# Patient Record
Sex: MALE | Race: WHITE | NOT HISPANIC OR LATINO | Employment: OTHER | URBAN - NONMETROPOLITAN AREA
[De-identification: names, ages, dates, MRNs, and addresses within clinical notes are randomized per-mention and may not be internally consistent; named-entity substitution may affect disease eponyms.]

---

## 2017-03-03 DIAGNOSIS — R06.02 SOB (SHORTNESS OF BREATH): Primary | ICD-10-CM

## 2017-03-07 ENCOUNTER — HOSPITAL ENCOUNTER (OUTPATIENT)
Dept: GENERAL RADIOLOGY | Facility: HOSPITAL | Age: 67
Discharge: HOME | End: 2017-03-07
Attending: INTERNAL MEDICINE | Admitting: INTERNAL MEDICINE

## 2017-03-07 ENCOUNTER — APPOINTMENT (OUTPATIENT)
Dept: GENERAL RADIOLOGY | Facility: HOSPITAL | Age: 67
End: 2017-03-07
Attending: INTERNAL MEDICINE

## 2017-03-07 ENCOUNTER — OFFICE VISIT (OUTPATIENT)
Dept: PULMONOLOGY | Facility: CLINIC | Age: 67
End: 2017-03-07

## 2017-03-07 VITALS
BODY MASS INDEX: 29.8 KG/M2 | HEIGHT: 72 IN | OXYGEN SATURATION: 95 % | TEMPERATURE: 98 F | WEIGHT: 220 LBS | SYSTOLIC BLOOD PRESSURE: 132 MMHG | HEART RATE: 80 BPM | DIASTOLIC BLOOD PRESSURE: 88 MMHG

## 2017-03-07 DIAGNOSIS — R06.02 SOB (SHORTNESS OF BREATH): ICD-10-CM

## 2017-03-07 DIAGNOSIS — J60 CWP (COALWORKERS PNEUMOCONIOSIS) (HCC): Primary | ICD-10-CM

## 2017-03-07 PROCEDURE — 99213 OFFICE O/P EST LOW 20 MIN: CPT | Performed by: INTERNAL MEDICINE

## 2017-03-07 PROCEDURE — 71020 XR CHEST PA AND LATERAL: CPT | Performed by: RADIOLOGY

## 2017-03-07 PROCEDURE — 71020 HC CHEST PA AND LATERAL: CPT

## 2017-03-07 NOTE — PROGRESS NOTES
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  Also has diabetes and hypertension being followed by his family physician.  For his breathing he takes his Spiriva once a day And occasional Use of albuterol inhaler      Review of Systems mild shortness of breath on exertion and occasional use of rescue inhaler diabetes and hypertension is under control    Active Problems:  Problem List Items Addressed This Visit        Respiratory    CWP (coalworkers pneumoconiosis) - Primary          Past Medical History:  Past Medical History   Diagnosis Date   • Arthritis    • Diabetes mellitus        Family History:  History reviewed. No pertinent family history.    Social History:  Social History   Substance Use Topics   • Smoking status: Former Smoker     Years: 35.00     Types: Cigarettes   • Smokeless tobacco: Never Used   • Alcohol use No       Current Medications:  Current Outpatient Prescriptions   Medication Sig Dispense Refill   • ACCU-CHEK SMARTVIEW test strip TEST ONCE DAILY  0   • amitriptyline (ELAVIL) 50 MG tablet   0   • clopidogrel (PLAVIX) 75 MG tablet   0   • fluconazole (DIFLUCAN) 200 MG tablet      • LORazepam (ATIVAN) 1 MG tablet TAKE 1 TABLET BY MOUTH AT BEDTIME. MUST LAST 30 DAYS  0   • losartan (COZAAR) 100 MG tablet take 1 tablet by mouth once daily  0   • losartan-hydrochlorothiazide (HYZAAR) 100-25 MG per tablet      • orphenadrine (NORFLEX) 100 MG 12 hr tablet   0   • simvastatin (ZOCOR) 80 MG tablet   0   • tiotropium (SPIRIVA) 18 MCG per inhalation capsule Place 2 puffs into inhaler and inhale 1 (one) time daily. 30 capsule 11   • VENTOLIN  (90 BASE) MCG/ACT inhaler   0     No current facility-administered medications for this visit.        Allergies:  No Known Allergies    Vitals:  Visit Vitals   • /88 (BP Location: Left arm, Patient Position: Sitting, Cuff  "Size: Adult)   • Pulse 80   • Temp 98 °F (36.7 °C) (Oral)   • Ht 72\" (182.9 cm)   • Wt 220 lb (99.8 kg)   • SpO2 95%   • BMI 29.84 kg/m2       Physical Exam no acute distress vital signs is stable lungs clear heart regular    Imaging: Chest x-ray shows upper lobe masses and change Compared with x-ray of a year ago and slightly worse than November 2014    PFT:  Results for orders placed or performed in visit on 09/07/16   Pulmonary function test   Result Value Ref Range    FEV1 2.22 liters    FVC 3.77 liters           ASSESSMENT AND DIAGNOSIS:1-: Workers pneumoconiosis with progressive massive fibrosis in the stable no change in a year on x-ray2-COPD mild to relatively stable    Recommendation continue current meds    Follow-Up: Return in May With a PFT and further follow-up accordingly                    "

## 2017-05-10 ENCOUNTER — OFFICE VISIT (OUTPATIENT)
Dept: PULMONOLOGY | Facility: CLINIC | Age: 67
End: 2017-05-10

## 2017-05-10 VITALS
TEMPERATURE: 97.8 F | SYSTOLIC BLOOD PRESSURE: 160 MMHG | BODY MASS INDEX: 30.07 KG/M2 | WEIGHT: 222 LBS | OXYGEN SATURATION: 95 % | HEART RATE: 97 BPM | HEIGHT: 72 IN | DIASTOLIC BLOOD PRESSURE: 70 MMHG

## 2017-05-10 DIAGNOSIS — J60 CWP (COALWORKERS PNEUMOCONIOSIS) (HCC): Primary | ICD-10-CM

## 2017-05-10 PROCEDURE — 99213 OFFICE O/P EST LOW 20 MIN: CPT | Performed by: INTERNAL MEDICINE

## 2017-09-05 ENCOUNTER — OFFICE VISIT (OUTPATIENT)
Dept: PULMONOLOGY | Facility: CLINIC | Age: 67
End: 2017-09-05

## 2017-09-05 VITALS
WEIGHT: 204 LBS | TEMPERATURE: 98.8 F | DIASTOLIC BLOOD PRESSURE: 88 MMHG | HEIGHT: 72 IN | BODY MASS INDEX: 27.63 KG/M2 | HEART RATE: 87 BPM | OXYGEN SATURATION: 94 % | SYSTOLIC BLOOD PRESSURE: 142 MMHG

## 2017-09-05 DIAGNOSIS — J60 CWP (COALWORKERS PNEUMOCONIOSIS) (HCC): ICD-10-CM

## 2017-09-05 DIAGNOSIS — J44.9 CHRONIC OBSTRUCTIVE PULMONARY DISEASE, UNSPECIFIED COPD TYPE (HCC): Primary | ICD-10-CM

## 2017-09-05 LAB
FEV1: 2.31 LITERS
FVC VOL RESPIRATORY: 3.58 LITERS

## 2017-09-05 PROCEDURE — 99212 OFFICE O/P EST SF 10 MIN: CPT | Performed by: INTERNAL MEDICINE

## 2017-09-05 PROCEDURE — 94010 BREATHING CAPACITY TEST: CPT | Performed by: INTERNAL MEDICINE

## 2017-09-05 ASSESSMENT — PULMONARY FUNCTION TESTS
FVC: 3.58
FEV1: 2.31

## 2017-09-05 NOTE — PROGRESS NOTES
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  Also has mild COPD that is controlled with the Spiriva and albuterol that he uses couple of times a.  He also has history of stenting And being followed by GI cardiology and does not have any trouble with his heart      Review of Systems minimal cough no expectoration and continued shortness of breath on exertion no chest pain review of system otherwise noncontributory    Active Problems:  Problem List Items Addressed This Visit        Respiratory    Chronic obstructive pulmonary disease - Primary    Relevant Orders    Pulmonary Function Test (Completed)    CWP (coalworkers pneumoconiosis)          Past Medical History:  Past Medical History:   Diagnosis Date   • Arthritis    • Diabetes mellitus        Family History:  History reviewed. No pertinent family history.    Social History:  Social History   Substance Use Topics   • Smoking status: Former Smoker     Years: 35.00     Types: Cigarettes   • Smokeless tobacco: Never Used   • Alcohol use No       Current Medications:  Current Outpatient Prescriptions   Medication Sig Dispense Refill   • ACCU-CHEK SMARTVIEW test strip TEST ONCE DAILY  0   • amitriptyline (ELAVIL) 50 MG tablet   0   • clopidogrel (PLAVIX) 75 MG tablet   0   • LORazepam (ATIVAN) 1 MG tablet TAKE 1 TABLET BY MOUTH AT BEDTIME. MUST LAST 30 DAYS  0   • losartan (COZAAR) 100 MG tablet take 1 tablet by mouth once daily  0   • losartan-hydrochlorothiazide (HYZAAR) 100-25 MG per tablet      • orphenadrine (NORFLEX) 100 MG 12 hr tablet   0   • simvastatin (ZOCOR) 80 MG tablet   0   • tiotropium (SPIRIVA) 18 MCG per inhalation capsule Place 2 puffs into inhaler and inhale 1 (one) time daily. 30 capsule 11   • VENTOLIN  (90 BASE) MCG/ACT inhaler   0     No current facility-administered medications for this visit.   "      Allergies:  No Known Allergies    Vitals:  /88 (BP Location: Left arm, Patient Position: Sitting, Cuff Size: Adult)  Pulse 87  Temp 98.8 °F (37.1 °C) (Oral)   Ht 72\" (182.9 cm)  Wt 204 lb (92.5 kg)  SpO2 94%  BMI 27.67 kg/m2    Physical Exam no acute distress walking with a cane lungs clear heart regular no clubbing cyanosis or edema    Imaging: Last chest x-ray was in March3rd showing bilateral upper lobe masses unchanged compared with previous x-rays    PFT: FVC 78 FEV1 68%  Results for orders placed or performed in visit on 09/05/17   Pulmonary Function Test   Result Value Ref Range    FEV1 2.31 liters    FVC 3.58 liters           ASSESSMENT AND DIAGNOSIS:1-complicated pneumoconiosis with progressive massive fibrosis that seems to be stable2-COPD mild3-coronary artery disease with stenting Being followed by cardiology  Recommendation continue with Spiriva and albuterol as needed.  Reminded him of getting his flu vaccine as soon as possible      Follow-Up: Return to us in 6 months with annual chest x-ray or earlier as needed                    "

## 2018-03-05 ENCOUNTER — TRANSCRIBE ORDERS (OUTPATIENT)
Dept: PULMONOLOGY | Facility: CLINIC | Age: 68
End: 2018-03-05

## 2018-03-05 ENCOUNTER — HOSPITAL ENCOUNTER (OUTPATIENT)
Dept: GENERAL RADIOLOGY | Facility: HOSPITAL | Age: 68
Discharge: HOME | End: 2018-03-05
Attending: INTERNAL MEDICINE | Admitting: INTERNAL MEDICINE

## 2018-03-05 DIAGNOSIS — R06.02 SOB (SHORTNESS OF BREATH): Primary | ICD-10-CM

## 2018-03-05 DIAGNOSIS — R06.02 SOB (SHORTNESS OF BREATH): ICD-10-CM

## 2018-03-05 PROCEDURE — 71046 X-RAY EXAM CHEST 2 VIEWS: CPT

## 2018-03-05 PROCEDURE — 71046 X-RAY EXAM CHEST 2 VIEWS: CPT | Performed by: RADIOLOGY

## 2018-03-06 ENCOUNTER — OFFICE VISIT (OUTPATIENT)
Dept: PULMONOLOGY | Facility: CLINIC | Age: 68
End: 2018-03-06

## 2018-03-06 VITALS
OXYGEN SATURATION: 97 % | HEART RATE: 70 BPM | HEIGHT: 72 IN | WEIGHT: 213 LBS | TEMPERATURE: 98.2 F | SYSTOLIC BLOOD PRESSURE: 163 MMHG | BODY MASS INDEX: 28.85 KG/M2 | DIASTOLIC BLOOD PRESSURE: 80 MMHG

## 2018-03-06 DIAGNOSIS — J44.9 CHRONIC OBSTRUCTIVE PULMONARY DISEASE, UNSPECIFIED COPD TYPE (HCC): Primary | ICD-10-CM

## 2018-03-06 DIAGNOSIS — J60 CWP (COALWORKERS PNEUMOCONIOSIS) (HCC): ICD-10-CM

## 2018-03-06 PROCEDURE — 99213 OFFICE O/P EST LOW 20 MIN: CPT | Performed by: INTERNAL MEDICINE

## 2018-03-06 RX ORDER — GABAPENTIN 100 MG/1
100 CAPSULE ORAL NIGHTLY
COMMUNITY
End: 2020-10-07 | Stop reason: ALTCHOICE

## 2018-03-06 NOTE — PROGRESS NOTES
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  Also has coronary artery disease with 4 stents in the past being followed by cardiology      Review of Systems continued shortness of breath with minimal exertion some cough occasionally expectoration review of systems otherwise noncontributory specifically no chest pain palpitation or swelling    Active Problems:  Problem List Items Addressed This Visit        Respiratory    Chronic obstructive pulmonary disease - Primary    CWP (coalworkers pneumoconiosis)          Past Medical History:  Past Medical History:   Diagnosis Date   • Arthritis    • Diabetes mellitus        Family History:  History reviewed. No pertinent family history.    Social History:  Social History   Substance Use Topics   • Smoking status: Former Smoker     Years: 35.00     Types: Cigarettes   • Smokeless tobacco: Never Used   • Alcohol use No       Current Medications:  Current Outpatient Prescriptions   Medication Sig Dispense Refill   • ACCU-CHEK SMARTVIEW test strip TEST ONCE DAILY  0   • amitriptyline (ELAVIL) 50 MG tablet   0   • clopidogrel (PLAVIX) 75 MG tablet   0   • gabapentin (NEURONTIN) 100 MG capsule Take 100 mg by mouth Every Night.     • LORazepam (ATIVAN) 1 MG tablet TAKE 1 TABLET BY MOUTH AT BEDTIME. MUST LAST 30 DAYS  0   • losartan (COZAAR) 100 MG tablet take 1 tablet by mouth once daily  0   • losartan-hydrochlorothiazide (HYZAAR) 100-25 MG per tablet      • orphenadrine (NORFLEX) 100 MG 12 hr tablet   0   • simvastatin (ZOCOR) 80 MG tablet   0   • tiotropium (SPIRIVA) 18 MCG per inhalation capsule Place 2 puffs into inhaler and inhale 1 (one) time daily. 30 capsule 11   • VENTOLIN HFA  "108 (90 BASE) MCG/ACT inhaler   0     No current facility-administered medications for this visit.        Allergies:  No Known Allergies    Vitals:  /80 (BP Location: Left arm, Patient Position: Sitting)  Pulse 70  Temp 98.2 °F (36.8 °C)  Ht 182.9 cm (72.01\")  Wt 96.6 kg (213 lb)  SpO2 97%  BMI 28.88 kg/m2    Physical Exam no acute distress vital signs stable O2 sat 97% on room air lungs clear heart regular no clubbing cyanosis or edema    Imaging: Chest x-rays show upper lobe masses unchanged in a year slightly worse since 2014    PFT: No PFT today last visit in September FVC was 72 and FEV1 68% indicating mild obstructive impairment  Results for orders placed or performed in visit on 09/05/17   Pulmonary Function Test   Result Value Ref Range    FEV1 2.31 liters    FVC 3.58 liters           ASSESSMENT AND DIAGNOSIS:1-Cpal workers pneumoconiosis with progressive massive fibrosis progressing Slowly over the years since 2014 that the x-ray in our 2-- COPD mild and stable 3-coronary artery disease with stenting Being followed by cardiology  Recommendation continue current medications      Follow-Up:Return in mid-August with a PFT or earlier as needed.  He was told that one of our colleagues can see him in the office no longer here                    "

## 2018-03-12 DIAGNOSIS — J44.9 CHRONIC OBSTRUCTIVE PULMONARY DISEASE, UNSPECIFIED COPD TYPE (HCC): ICD-10-CM

## 2018-08-15 ENCOUNTER — OFFICE VISIT (OUTPATIENT)
Dept: PULMONOLOGY | Facility: CLINIC | Age: 68
End: 2018-08-15

## 2018-08-15 VITALS
WEIGHT: 213 LBS | DIASTOLIC BLOOD PRESSURE: 92 MMHG | BODY MASS INDEX: 28.85 KG/M2 | OXYGEN SATURATION: 95 % | SYSTOLIC BLOOD PRESSURE: 156 MMHG | HEIGHT: 72 IN | TEMPERATURE: 98.3 F | HEART RATE: 81 BPM

## 2018-08-15 DIAGNOSIS — J44.9 CHRONIC OBSTRUCTIVE PULMONARY DISEASE, UNSPECIFIED COPD TYPE (HCC): Primary | ICD-10-CM

## 2018-08-15 DIAGNOSIS — I25.10 CORONARY ARTERIOSCLEROSIS: ICD-10-CM

## 2018-08-15 DIAGNOSIS — J60 CWP (COALWORKERS PNEUMOCONIOSIS) (HCC): ICD-10-CM

## 2018-08-15 LAB
FEV1: 2.07 LITERS
FVC VOL RESPIRATORY: 3.41 LITERS

## 2018-08-15 PROCEDURE — 94010 BREATHING CAPACITY TEST: CPT | Performed by: INTERNAL MEDICINE

## 2018-08-15 PROCEDURE — 99213 OFFICE O/P EST LOW 20 MIN: CPT | Performed by: INTERNAL MEDICINE

## 2018-08-15 ASSESSMENT — PULMONARY FUNCTION TESTS
FVC: 3.41
FEV1: 2.07

## 2018-08-15 NOTE — PROGRESS NOTES
History of Present Illness 5-year-old gentleman returning for follow-up of COPD that is mild and pneumoconiosis that is complicated with progressive massive fibrosis unchanged on the x-rays for several years.  His past history significant for smoking until 2004 when he had a heart attack and ended up requiring stenting.  27th breathing he takes his Spiriva once a day and albuterol inhaler couple of times a day      Review of Systems minimal cough no expectoration some shortness of breath on exertion no chest pain palpitation edema review of system otherwise noncontributory    Active Problems:  Problem List Items Addressed This Visit        Respiratory    Chronic obstructive pulmonary disease (CMS/[facility]) - Primary    Relevant Orders    Pulmonary Function Test (Completed)    CWP (coalworkers pneumoconiosis) (CMS/[facility])    Relevant Orders    Pulmonary Function Test (Completed)      Other Visit Diagnoses     Coronary arteriosclerosis        Relevant Orders    Pulmonary Function Test (Completed)          Past Medical History:  Past Medical History:   Diagnosis Date   • Arthritis    • Diabetes mellitus (CMS/[facility])        Family History:  History reviewed. No pertinent family history.    Social History:  Social History   Substance Use Topics   • Smoking status: Former Smoker     Years: 35.00     Types: Cigarettes   • Smokeless tobacco: Never Used   • Alcohol use No       Current Medications:  Current Outpatient Prescriptions   Medication Sig Dispense Refill   • ACCU-CHEK SMARTVIEW test strip TEST ONCE DAILY  0   • amitriptyline (ELAVIL) 50 MG tablet   0   • clopidogrel (PLAVIX) 75 MG tablet   0   • gabapentin (NEURONTIN) 100 MG capsule Take 100 mg by mouth Every Night.     • LORazepam (ATIVAN) 1 MG tablet TAKE 1 TABLET BY MOUTH AT BEDTIME. MUST LAST 30 DAYS  0   • losartan (COZAAR) 100 MG tablet take 1 tablet by mouth once daily  0   • losartan-hydrochlorothiazide (HYZAAR) 100-25 MG per tablet      • orphenadrine (NORFLEX) 100  "MG 12 hr tablet   0   • simvastatin (ZOCOR) 80 MG tablet   0   • tiotropium (SPIRIVA) 18 MCG per inhalation capsule Place 1 capsule into inhaler and inhale Daily. 30 capsule 11   • VENTOLIN  (90 BASE) MCG/ACT inhaler   0     No current facility-administered medications for this visit.        Allergies:  No Known Allergies    Vitals:  /92   Pulse 81   Temp 98.3 °F (36.8 °C) (Oral)   Ht 182.9 cm (72\")   Wt 96.6 kg (213 lb)   SpO2 95%   BMI 28.89 kg/m²     Physical Exam no acute distress vital signs are stable O2 sat 95% on room air walking Indicating lungs clear heart regular no clubbing cyanosis or edema    Imaging: Chest x-ray of March 5, 2018 shows bilateral upper lobe masses with progressive massive fibrosis    PFT: FVC 77 FEV1 64% indicating Mild obstructive impairmt.  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  Reminded the patient and his wife to keep in old x-ray at home for future reference get annual flu vaccine and pneumonia vaccine per protocol      Follow-Up: Turn in 5 month with annual chest x-ray or earlier as needed                    "

## 2019-01-14 ENCOUNTER — HOSPITAL ENCOUNTER (OUTPATIENT)
Dept: GENERAL RADIOLOGY | Facility: HOSPITAL | Age: 69
Discharge: HOME | End: 2019-01-14
Attending: INTERNAL MEDICINE | Admitting: INTERNAL MEDICINE

## 2019-01-14 ENCOUNTER — TRANSCRIBE ORDERS (OUTPATIENT)
Dept: PULMONOLOGY | Facility: CLINIC | Age: 69
End: 2019-01-14

## 2019-01-14 DIAGNOSIS — R06.02 SOB (SHORTNESS OF BREATH): Primary | ICD-10-CM

## 2019-01-14 DIAGNOSIS — R06.02 SOB (SHORTNESS OF BREATH): ICD-10-CM

## 2019-01-14 PROCEDURE — 71046 X-RAY EXAM CHEST 2 VIEWS: CPT | Performed by: RADIOLOGY

## 2019-01-14 PROCEDURE — 71046 X-RAY EXAM CHEST 2 VIEWS: CPT

## 2019-01-15 ENCOUNTER — OFFICE VISIT (OUTPATIENT)
Dept: PULMONOLOGY | Facility: CLINIC | Age: 69
End: 2019-01-15

## 2019-01-15 VITALS
WEIGHT: 212.6 LBS | HEART RATE: 92 BPM | OXYGEN SATURATION: 94 % | SYSTOLIC BLOOD PRESSURE: 144 MMHG | HEIGHT: 72 IN | BODY MASS INDEX: 28.79 KG/M2 | DIASTOLIC BLOOD PRESSURE: 84 MMHG

## 2019-01-15 DIAGNOSIS — J44.9 CHRONIC OBSTRUCTIVE PULMONARY DISEASE, UNSPECIFIED COPD TYPE (HCC): ICD-10-CM

## 2019-01-15 DIAGNOSIS — J60 CWP (COALWORKERS PNEUMOCONIOSIS) (HCC): Primary | ICD-10-CM

## 2019-01-15 DIAGNOSIS — I25.10 CORONARY ARTERIOSCLEROSIS: ICD-10-CM

## 2019-01-15 PROCEDURE — 99212 OFFICE O/P EST SF 10 MIN: CPT | Performed by: INTERNAL MEDICINE

## 2019-01-15 RX ORDER — ASPIRIN 81 MG/1
81 TABLET ORAL DAILY
Status: ON HOLD | COMMUNITY
End: 2021-11-16

## 2019-01-15 RX ORDER — PREGABALIN 75 MG/1
75 CAPSULE ORAL 2 TIMES DAILY
COMMUNITY
End: 2020-10-07 | Stop reason: ALTCHOICE

## 2019-01-15 RX ORDER — MELATONIN
1000 DAILY
Status: ON HOLD | COMMUNITY
End: 2021-11-16

## 2019-01-15 RX ORDER — ATORVASTATIN CALCIUM 40 MG/1
40 TABLET, FILM COATED ORAL DAILY
COMMUNITY
End: 2020-10-07 | Stop reason: ALTCHOICE

## 2019-01-15 NOTE — PROGRESS NOTES
Subjective   Chief Complaint   Patient presents with   • COPD       Bhavna Astorga is a 68 y.o. 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  Past history significant for coronary artery disease and stenting and his cardiologist is satisfied with his cardiac status    Review of Systems minimal cough no expectoration some shortness of breath on exertion no chest pain palpitation or edema review of systems noncontributory    History reviewed. No pertinent family history.    Past Medical History:   Diagnosis Date   • Arthritis    • Diabetes mellitus (CMS/[facility])        History reviewed. No pertinent surgical history.    Social History     Socioeconomic History   • Marital status:      Spouse name: Not on file   • Number of children: Not on file   • Years of education: Not on file   • Highest education level: Not on file   Social Needs   • Financial resource strain: Not on file   • Food insecurity - worry: Not on file   • Food insecurity - inability: Not on file   • Transportation needs - medical: Not on file   • Transportation needs - non-medical: Not on file   Occupational History   • Not on file   Tobacco Use   • Smoking status: Former Smoker     Years: 35.00     Types: Cigarettes   • Smokeless tobacco: Never Used   Substance and Sexual Activity   • Alcohol use: No   • Drug use: No   • Sexual activity: Defer   Other Topics Concern   • Not on file   Social History Narrative   • Not on file        Physical Exam: No acute distress walking with a cane vital signs stable lungs clear O2 sat 94% on room air    PFT: No PFT today and had to a PFT  August 15 with FVC of 77 FEV1 64% indicating mild obstructive impairment    Imaging: Chest x-ray of yesterday It is again compatible with bilateral upper lobe masses of progressive massive fibrosis    Other Labs:       ASSESSMENT1-Coal workers pneumoconiosis with progressive massive fibrosis is stable on x-ray2-COPD mild and seems to be stable3-coronary artery disease with history of stenting being followed by cardiology        Recommendations: Continue Spiriva once a day and Albuterol inhaler as needed    Follow up: Return in 6 months with a PFT or earlier as needed

## 2019-07-16 ENCOUNTER — OFFICE VISIT (OUTPATIENT)
Dept: PULMONOLOGY | Facility: CLINIC | Age: 69
End: 2019-07-16

## 2019-07-16 VITALS
WEIGHT: 215 LBS | HEIGHT: 72 IN | BODY MASS INDEX: 29.12 KG/M2 | SYSTOLIC BLOOD PRESSURE: 172 MMHG | DIASTOLIC BLOOD PRESSURE: 91 MMHG | OXYGEN SATURATION: 95 % | HEART RATE: 103 BPM | TEMPERATURE: 98 F

## 2019-07-16 DIAGNOSIS — I25.10 CORONARY ARTERIOSCLEROSIS: ICD-10-CM

## 2019-07-16 DIAGNOSIS — J44.9 CHRONIC OBSTRUCTIVE PULMONARY DISEASE, UNSPECIFIED COPD TYPE (HCC): ICD-10-CM

## 2019-07-16 DIAGNOSIS — J60 CWP (COALWORKERS PNEUMOCONIOSIS) (HCC): Primary | ICD-10-CM

## 2019-07-16 LAB
FEV1: 2.01 LITERS
FVC VOL RESPIRATORY: 3.29 LITERS

## 2019-07-16 PROCEDURE — 94010 BREATHING CAPACITY TEST: CPT | Performed by: INTERNAL MEDICINE

## 2019-07-16 PROCEDURE — 99212 OFFICE O/P EST SF 10 MIN: CPT | Performed by: INTERNAL MEDICINE

## 2019-07-16 ASSESSMENT — PULMONARY FUNCTION TESTS
FVC: 3.29
FEV1: 2.01

## 2019-07-16 NOTE — PROGRESS NOTES
Subjective   Chief Complaint   Patient presents with   • COPD       Bhavna Astorga is a 69 y.o. 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 No pertinent family history.    Past Medical History:   Diagnosis Date   • Arthritis    • Diabetes mellitus (CMS/HCC)        No past surgical history on file.    Social History     Socioeconomic History   • Marital status:      Spouse name: Not on file   • Number of children: Not on file   • Years of education: Not on file   • Highest education level: Not on file   Tobacco Use   • Smoking status: Former Smoker     Years: 35.00     Types: Cigarettes   • Smokeless tobacco: Never Used   Substance and Sexual Activity   • Alcohol use: No   • Drug use: No   • Sexual activity: Defer        Physical Exam: Chronically ill but in no acute distress vital signs stable O2 sat 95% on room air lungs clear minimal expiratory rhonchi heart regular no clubbing cyanosis or edema    PFT:  FEV1 99% better than what he had August 2018 that were 77 and 64%    Imaging: Chest x-ray shows bilateral upper lobe masses of complicated pneumoconiosis that is unchanged    Other Labs:       ASSESSMENT1-complicated pneumoconiosis with progressive massive fibrosis2-OPD mild3-aortic disease with history of myocardial infarction seemingly stable        Recommendations: Continue current medications gave him scription for Z-Berhane to use in case of acute purulent bronchitis reminded him of getting his flu vaccine    Follow  up: Return in 6 months with chest x-ray and a PFT

## 2020-01-16 ENCOUNTER — HOSPITAL ENCOUNTER (OUTPATIENT)
Dept: GENERAL RADIOLOGY | Facility: HOSPITAL | Age: 70
Discharge: HOME | End: 2020-01-16
Admitting: INTERNAL MEDICINE

## 2020-01-16 ENCOUNTER — OFFICE VISIT (OUTPATIENT)
Dept: PULMONOLOGY | Facility: CLINIC | Age: 70
End: 2020-01-16

## 2020-01-16 VITALS
DIASTOLIC BLOOD PRESSURE: 109 MMHG | HEIGHT: 72 IN | TEMPERATURE: 98 F | SYSTOLIC BLOOD PRESSURE: 165 MMHG | HEART RATE: 116 BPM | WEIGHT: 150.2 LBS | OXYGEN SATURATION: 97 % | BODY MASS INDEX: 20.34 KG/M2

## 2020-01-16 DIAGNOSIS — I25.10 CORONARY ARTERIOSCLEROSIS: ICD-10-CM

## 2020-01-16 DIAGNOSIS — J44.9 CHRONIC OBSTRUCTIVE PULMONARY DISEASE, UNSPECIFIED COPD TYPE (HCC): Primary | ICD-10-CM

## 2020-01-16 DIAGNOSIS — J60 CWP (COALWORKERS PNEUMOCONIOSIS) (HCC): ICD-10-CM

## 2020-01-16 DIAGNOSIS — J44.9 CHRONIC OBSTRUCTIVE PULMONARY DISEASE, UNSPECIFIED COPD TYPE (HCC): ICD-10-CM

## 2020-01-16 PROCEDURE — 71046 X-RAY EXAM CHEST 2 VIEWS: CPT | Performed by: RADIOLOGY

## 2020-01-16 PROCEDURE — 99212 OFFICE O/P EST SF 10 MIN: CPT | Performed by: INTERNAL MEDICINE

## 2020-01-16 PROCEDURE — 94010 BREATHING CAPACITY TEST: CPT | Performed by: INTERNAL MEDICINE

## 2020-01-16 PROCEDURE — 71046 X-RAY EXAM CHEST 2 VIEWS: CPT

## 2020-01-16 NOTE — PROGRESS NOTES
Subjective   No chief complaint on file.      Bhavna Astorga is a 69 y.o. male     History of Present Illness 89-year-old male returning for follow-up of complicated pneumoconiosis and COPD his past history significant for DM ~2004 is complicated pneumoconiosis also has history of coronary heart disease and stenting 6 years ago    Review of Systems had a chest cold recently and has been coughing up greenish sputum has taken a round of Z-Berhane but has increasing cough and shortness of breath chest pain palpitation or edema    History reviewed. No pertinent family history.    Past Medical History:   Diagnosis Date   • Arthritis    • Diabetes mellitus (CMS/HCC)        No past surgical history on file.    Social History     Socioeconomic History   • Marital status:      Spouse name: Not on file   • Number of children: Not on file   • Years of education: Not on file   • Highest education level: Not on file   Tobacco Use   • Smoking status: Former Smoker     Years: 35.00     Types: Cigarettes   • Smokeless tobacco: Never Used   Substance and Sexual Activity   • Alcohol use: No   • Drug use: No   • Sexual activity: Defer        Physical Exam: Acute distress vital signs stable O2 sat 98% room air scattered expiratory rhonchi heart regular no clubbing cyanosis or edema    PFT: FVC 75 FEV1 46% moderate obstructive impairment last time his FVC was 118 and FEV1 99% July 2019    Imaging: Chest x-ray shows bilateral upper lobe masses of progressive massive fibrosis no significant change compared with x-ray here earlier    Other Labs:       ASSESSMENT1-workers pneumoconiosis with progressive massive fibrosis2-COPD with acute exacerbation and declining PFT significantly compared to last visit        Recommendations: Finish a Z-Berhane and change Spiriva to Trelegy once a day and continue Ventolin inhaler as needed    Follow up: Fifth with a PFT

## 2020-01-22 DIAGNOSIS — J44.9 CHRONIC OBSTRUCTIVE PULMONARY DISEASE, UNSPECIFIED COPD TYPE (HCC): ICD-10-CM

## 2020-01-22 DIAGNOSIS — J60 CWP (COALWORKERS PNEUMOCONIOSIS) (HCC): ICD-10-CM

## 2020-01-22 DIAGNOSIS — I25.10 CORONARY ARTERIOSCLEROSIS: ICD-10-CM

## 2020-02-06 ENCOUNTER — OFFICE VISIT (OUTPATIENT)
Dept: PULMONOLOGY | Facility: CLINIC | Age: 70
End: 2020-02-06

## 2020-02-06 VITALS
TEMPERATURE: 98 F | WEIGHT: 223 LBS | OXYGEN SATURATION: 95 % | BODY MASS INDEX: 26.33 KG/M2 | SYSTOLIC BLOOD PRESSURE: 162 MMHG | DIASTOLIC BLOOD PRESSURE: 84 MMHG | HEIGHT: 77 IN | HEART RATE: 94 BPM

## 2020-02-06 DIAGNOSIS — J60 CWP (COALWORKERS PNEUMOCONIOSIS) (HCC): ICD-10-CM

## 2020-02-06 DIAGNOSIS — J44.9 CHRONIC OBSTRUCTIVE PULMONARY DISEASE, UNSPECIFIED COPD TYPE (HCC): Primary | ICD-10-CM

## 2020-02-06 DIAGNOSIS — I25.10 CORONARY ARTERIOSCLEROSIS: ICD-10-CM

## 2020-02-06 PROCEDURE — 94010 BREATHING CAPACITY TEST: CPT | Performed by: INTERNAL MEDICINE

## 2020-02-06 PROCEDURE — 99212 OFFICE O/P EST SF 10 MIN: CPT | Performed by: INTERNAL MEDICINE

## 2020-02-06 NOTE — PROGRESS NOTES
Subjective   Chief Complaint   Patient presents with   • COPD       Bhavna Astorga is a 69 y.o. male     History of Present Illness 72-year-old man returning for follow-up of recent exacerbation of his COPD he was seen January 16 when he had a chest cold his past history significant for complicated pneumoconiosis diabetes coronary artery disease with history of heart attack and stenting 6 years ago he smoked for 35 years quit 2004 last visit he was given a round of Z-Berhane and his Spiriva was changed to Trelegy that has significantly helped    Review of Systems having less shortness of breath uses his Ventolin inhaler occasionally no cough or expectoration chest pain or edema    History reviewed. No pertinent family history.    Past Medical History:   Diagnosis Date   • Arthritis    • Diabetes mellitus (CMS/[facility])        No past surgical history on file.    Social History     Socioeconomic History   • Marital status:      Spouse name: Not on file   • Number of children: Not on file   • Years of education: Not on file   • Highest education level: Not on file   Tobacco Use   • Smoking status: Former Smoker     Years: 35.00     Types: Cigarettes   • Smokeless tobacco: Never Used   Substance and Sexual Activity   • Alcohol use: No   • Drug use: No   • Sexual activity: Defer        Physical Exam: No acute distress vital signs are stable O2 sat 95% lungs clear today heart regular no clubbing cyanosis or edema    PFT: FVC 82 FEV1 68% much better than 75 and 46% that he had in January 16    Imaging: His chest x-ray last visit showed the usual masses of the upper lobes compatible with coal workers pneumoconiosis    Other Labs:       ASSESSMENT1-COPD with recent exacerbation much better2-gated pneumoconiosis with progressive massive fibrosis3-artery disease with history of myocardial infarction and stenting        Recommendations: Continue Trelegy once a day Ventolin as needed and Z-Berhane in case of acute purulent  bronchitis    Follow up: Return in 4 months with a PFT or earlier as needed

## 2020-06-03 ENCOUNTER — OFFICE VISIT (OUTPATIENT)
Dept: PULMONOLOGY | Facility: CLINIC | Age: 70
End: 2020-06-03

## 2020-06-03 VITALS — BODY MASS INDEX: 30.48 KG/M2 | HEIGHT: 72 IN | WEIGHT: 225 LBS

## 2020-06-03 DIAGNOSIS — J60 CWP (COALWORKERS PNEUMOCONIOSIS) (HCC): ICD-10-CM

## 2020-06-03 DIAGNOSIS — I25.10 CORONARY ARTERIOSCLEROSIS: ICD-10-CM

## 2020-06-03 DIAGNOSIS — J44.9 CHRONIC OBSTRUCTIVE PULMONARY DISEASE, UNSPECIFIED COPD TYPE (HCC): Primary | ICD-10-CM

## 2020-06-03 PROCEDURE — 99441 PR PHYS/QHP TELEPHONE EVALUATION 5-10 MIN: CPT | Performed by: INTERNAL MEDICINE

## 2020-06-03 NOTE — PROGRESS NOTES
Subjective   Chief Complaint   Patient presents with   • COPD       Bhavna Astorga is a 70 y.o. male     History of Present Illness Mr. Astorga that gave consent for telephone conversation called him by Wednesday morning and Melinda 3 and talk to him on the phone for 10 minutes    Review of Systems 70-year-old gentleman that is complicated pneumoconiosis with COPD and history of coronary artery disease heart attack and stenting 6 years ago has been using Trelegy once a day and albuterol inhaler occasionally and did not need to use the antibiotic that he has for acute exacerbation last office visit with us was February 6 and recent exacerbation of COPD however his PFT was okay with FVC of 82 FEV1 65%.  Currently has some shortness of breath not much cough no chest pain no edema no review of system otherwise noncontributory    History reviewed. No pertinent family history.    Past Medical History:   Diagnosis Date   • Arthritis    • Diabetes mellitus (CMS/[facility])        No past surgical history on file.    Social History     Socioeconomic History   • Marital status:      Spouse name: Not on file   • Number of children: Not on file   • Years of education: Not on file   • Highest education level: Not on file   Tobacco Use   • Smoking status: Former Smoker     Years: 35.00     Types: Cigarettes   • Smokeless tobacco: Never Used   Substance and Sexual Activity   • Alcohol use: No   • Drug use: No   • Sexual activity: Defer        Physical Exam:  Mouth status telephone conversation obviously did not seem to be in any distress and communicated well    PFT: Last PFT in the office was February 6 with FVC of 82 FEV1 65% mild obstructive impairment    Imaging: Chest x-ray was January 16, 2020 showing bilateral upper lobe masses of complicated pneumoconiosis    Other Labs:       ASSESSMENT1-complicated pneumoconiosis stable x-ray1-COPD mild that seems to be under control3-renal artery disease with history of myocardial  infarction and stenting 68 and stable being followed by cardiology        Recommendations: Continue current medications    Follow up: Return to the office in 4months will recall regularly or as needed advised him to avoid the crowds to prevent himself from catching COVID-19 and do the vaccine as soon as available he will call earlier as needed

## 2020-07-01 RX ORDER — ALBUTEROL SULFATE 90 UG/1
AEROSOL, METERED RESPIRATORY (INHALATION)
Qty: 1 INHALER | Refills: 5 | Status: SHIPPED | OUTPATIENT
Start: 2020-07-01 | End: 2020-10-09 | Stop reason: SDUPTHER

## 2020-08-17 RX ORDER — AZITHROMYCIN 250 MG/1
TABLET, FILM COATED ORAL
Qty: 6 TABLET | Refills: 0 | Status: SHIPPED | OUTPATIENT
Start: 2020-08-17 | End: 2020-10-09 | Stop reason: SDUPTHER

## 2020-10-06 RX ORDER — AZITHROMYCIN 250 MG/1
TABLET, FILM COATED ORAL
Qty: 6 TABLET | Refills: 0 | Status: CANCELLED | OUTPATIENT
Start: 2020-10-06

## 2020-10-06 RX ORDER — ALBUTEROL SULFATE 90 UG/1
AEROSOL, METERED RESPIRATORY (INHALATION) SEE ADMIN INSTRUCTIONS
Status: CANCELLED | OUTPATIENT
Start: 2020-10-06

## 2020-10-07 ENCOUNTER — OFFICE VISIT (OUTPATIENT)
Dept: PULMONOLOGY | Facility: CLINIC | Age: 70
End: 2020-10-07

## 2020-10-07 ENCOUNTER — HOSPITAL ENCOUNTER (OUTPATIENT)
Dept: CARDIOLOGY | Facility: HOSPITAL | Age: 70
Discharge: HOME | End: 2020-10-07
Admitting: PHYSICIAN ASSISTANT

## 2020-10-07 VITALS
HEART RATE: 78 BPM | SYSTOLIC BLOOD PRESSURE: 162 MMHG | HEIGHT: 72 IN | DIASTOLIC BLOOD PRESSURE: 80 MMHG | TEMPERATURE: 97.7 F | BODY MASS INDEX: 28.99 KG/M2 | WEIGHT: 214 LBS | OXYGEN SATURATION: 98 %

## 2020-10-07 DIAGNOSIS — J60 CWP (COALWORKERS PNEUMOCONIOSIS) (HCC): ICD-10-CM

## 2020-10-07 DIAGNOSIS — R06.09 DYSPNEA ON EXERTION: ICD-10-CM

## 2020-10-07 DIAGNOSIS — J44.9 CHRONIC OBSTRUCTIVE PULMONARY DISEASE, UNSPECIFIED COPD TYPE (HCC): Primary | ICD-10-CM

## 2020-10-07 DIAGNOSIS — Z87.891 FORMER SMOKER: ICD-10-CM

## 2020-10-07 PROCEDURE — 99214 OFFICE O/P EST MOD 30 MIN: CPT | Performed by: PHYSICIAN ASSISTANT

## 2020-10-07 PROCEDURE — 94618 PULMONARY STRESS TESTING: CPT | Performed by: PHYSICIAN ASSISTANT

## 2020-10-07 PROCEDURE — 93005 ELECTROCARDIOGRAM TRACING: CPT | Performed by: PHYSICIAN ASSISTANT

## 2020-10-07 PROCEDURE — 93010 ELECTROCARDIOGRAM REPORT: CPT | Performed by: INTERNAL MEDICINE

## 2020-10-07 RX ORDER — TAMSULOSIN HYDROCHLORIDE 0.4 MG/1
1 CAPSULE ORAL DAILY
COMMUNITY
Start: 2020-08-31

## 2020-10-07 RX ORDER — ERGOCALCIFEROL 1.25 MG/1
50000 CAPSULE ORAL
COMMUNITY
Start: 2020-07-28

## 2020-10-07 RX ORDER — GABAPENTIN 300 MG/1
300 CAPSULE ORAL 2 TIMES DAILY
COMMUNITY
Start: 2020-09-28

## 2020-10-07 RX ORDER — TRAZODONE HYDROCHLORIDE 50 MG/1
50 TABLET ORAL NIGHTLY
COMMUNITY
Start: 2020-09-28

## 2020-10-07 RX ORDER — LOSARTAN POTASSIUM 25 MG/1
25 TABLET ORAL DAILY
COMMUNITY
Start: 2020-07-28 | End: 2022-11-19 | Stop reason: SDUPTHER

## 2020-10-07 RX ORDER — ROSUVASTATIN CALCIUM 40 MG/1
40 TABLET, COATED ORAL DAILY
COMMUNITY
Start: 2020-07-28 | End: 2022-11-19 | Stop reason: SDUPTHER

## 2020-10-07 RX ORDER — INFLUENZA A VIRUS A/MICHIGAN/45/2015 X-275 (H1N1) ANTIGEN (FORMALDEHYDE INACTIVATED), INFLUENZA A VIRUS A/SINGAPORE/INFIMH-16-0019/2016 IVR-186 (H3N2) ANTIGEN (FORMALDEHYDE INACTIVATED), INFLUENZA B VIRUS B/PHUKET/3073/2013 ANTIGEN (FORMALDEHYDE INACTIVATED), AND INFLUENZA B VIRUS B/MARYLAND/15/2016 BX-69A ANTIGEN (FORMALDEHYDE INACTIVATED) 60; 60; 60; 60 UG/.7ML; UG/.7ML; UG/.7ML; UG/.7ML
INJECTION, SUSPENSION INTRAMUSCULAR
Status: ON HOLD | COMMUNITY
Start: 2020-09-21 | End: 2021-11-16

## 2020-10-07 NOTE — PROGRESS NOTES
Interval history since last visit:  No significant changes.     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 He has not previously qualified for supplemental oxygen. He states that overall symptoms seem to be at baseline at this time. He noted dyspnea on exertion, but states that he is mindful not to overexert himself. Shortness of breath is more significant upon walking uphill, moderate exertion. He has an albuterol inhaler for as needed use. He also currently uses Trelegy. Tolerating this well overall but states that sometimes it feels that this medication becomes stuck in his mouth rather than fully inhaling it.   No acute worsening of shortness of breath noted today otherwise.   Occasional cough noted, typically with clear phlegm production. Denies hemoptysis. No recent fever/chills noted. He is accompanied today by his wife.   He had previously been taking azithromycin once a month. Typically in the middle of the month, and states that he typically notices a slight increase of symptoms at this time, but was unsure if this is something that should be continued.   Smoking history notable for an approximately 36 year history with 1.5 ppd average, and quit in 2004.        Review of Systems   Constitutional: Negative for fatigue.   HENT: Negative for congestion and rhinorrhea.    Respiratory: Positive for shortness of breath (exertional). Negative for cough, chest tightness and wheezing.    Cardiovascular: Negative for chest pain and leg swelling.   Allergic/Immunologic: Negative for environmental allergies.   Neurological: Negative for dizziness and light-headedness.   All other systems reviewed and are negative.      Active Problems:  Problem List Items  Addressed This Visit     None          Past Medical History:  Past Medical History:   Diagnosis Date   • Arthritis    • Diabetes mellitus (CMS/HCC)        Family History:  Family History   Family history unknown: Yes       Social History:  Social History     Tobacco Use   • Smoking status: Former Smoker     Packs/day: 1.50     Years: 36.00     Pack years: 54.00     Types: Cigarettes     Start date: 1968     Quit date: 2004     Years since quittin.7   • Smokeless tobacco: Never Used   Substance Use Topics   • Alcohol use: No       Current Medications:  Current Outpatient Medications   Medication Sig Dispense Refill   • ACCU-CHEK SMARTVIEW test strip TEST ONCE DAILY  0   • ALBUTEROL SULFATE  (90 Base) MCG/ACT inhaler USE AS DIRECTED 1 inhaler 5   • amitriptyline (ELAVIL) 50 MG tablet   0   • aspirin 81 MG EC tablet Take 81 mg by mouth Daily.     • cholecalciferol (VITAMIN D3) 1000 units tablet Take 1,000 Units by mouth Daily.     • clopidogrel (PLAVIX) 75 MG tablet   0   • dapagliflozin (FARXIGA) 5 MG tablet tablet Take 5 mg by mouth Daily.     • esomeprazole (nexIUM) 20 MG capsule Take 20 mg by mouth Every Morning Before Breakfast.     • LORazepam (ATIVAN) 1 MG tablet TAKE 1 TABLET BY MOUTH AT BEDTIME. MUST LAST 30 DAYS  0   • orphenadrine (NORFLEX) 100 MG 12 hr tablet   0   • SITagliptin-MetFORMIN HCl ER (JANUMET XR) 100-1000 MG tablet Take 1 tablet by mouth Daily.     • tiotropium (SPIRIVA) 18 MCG per inhalation capsule Place 1 capsule into inhaler and inhale Daily. 30 capsule 11   • azithromycin (ZITHROMAX) 250 MG tablet Take 2 by mouth today then 1 daily for 4 days 6 tablet 0   • Fluzone High-Dose Quadrivalent 0.7 ML suspension prefilled syringe ADM 0.7ML IM UTD     • gabapentin (NEURONTIN) 300 MG capsule TK ONE C PO BID     • losartan (COZAAR) 25 MG tablet Take  by mouth Daily.     • rosuvastatin (CRESTOR) 40 MG tablet Take  by mouth Daily.     • tamsulosin (FLOMAX) 0.4 MG capsule 24 hr  "capsule TK 1 C PO QD     • traZODone (DESYREL) 50 MG tablet TK 1 T PO HS     • Trelegy Ellipta 100-62.5-25 MCG/INH aerosol powder  INHALE 1 PUFF PO QD     • vitamin D (ERGOCALCIFEROL) 1.25 MG (57917 UT) capsule capsule TK ONE C PO Q WEEK       No current facility-administered medications for this visit.        Allergies:  No Known Allergies    Vitals:  /80 (BP Location: Right arm, Patient Position: Sitting, Cuff Size: Adult)   Pulse 78   Temp 97.7 °F (36.5 °C) (Infrared)   Ht 182.9 cm (72\")   Wt 97.1 kg (214 lb)   SpO2 98%   BMI 29.02 kg/m²    Repeat BP of the right arm notable for 140/86.       Imaging:    Imaging Results (Most Recent)     None          Pulmonary Functions Testing Results:    FEV1   Date Value Ref Range Status   07/16/2019 2.01 liters Final     FVC   Date Value Ref Range Status   07/16/2019 3.29 liters Final       Results for orders placed or performed in visit on 07/16/19   Pulmonary Function Test   Result Value Ref Range    FEV1 2.01 liters    FVC 3.29 liters       Objective   Physical Exam  Constitutional:       Appearance: Normal appearance.      Comments: Elderly male   HENT:      Head: Normocephalic and atraumatic.   Eyes:      Pupils: Pupils are equal, round, and reactive to light.   Cardiovascular:      Rate and Rhythm: Normal rate and regular rhythm.      Heart sounds: Normal heart sounds.   Pulmonary:      Comments: Bilateral air entry positive. No wheezing, rhonchi, crackles.   Musculoskeletal: Normal range of motion.         General: No swelling.   Skin:     General: Skin is warm and dry.   Neurological:      Mental Status: He is alert and oriented to person, place, and time.   Psychiatric:         Behavior: Behavior normal.         Assessment/Plan     I have reviewed the past medical history, family history, social history, surgical history, and allergies.     Reviewed the previous chest xray imaging and report from November 2019. Notable for mass-like consolidation of the " upper lung fields bilaterally, without significant change.     Reviewed the in office spirometry from January 2020.         ICD-10-CM ICD-9-CM   1. Chronic obstructive pulmonary disease, unspecified COPD type (CMS/[facility])  J44.9 496   2. Dyspnea on exertion  R06.00 786.09   3. CWP (coalworkers pneumoconiosis) (CMS/[facility])  J60 500   4. Former smoker  Z87.891 V15.82          COPD, Coal miners pneumococcosis, Former smoker:   · Completed walk oximetry test in office.   He did not qualify for supplemental oxygen at this time.   · Continue albuterol inhaler as needed.   · On Trelegy ellipta inhaler once daily.  Preferred to continue with this inhaler at this time and consider switching to MDI form at the next visit.     · Patient requested refill of Azithromycin as he typically takes this once monthly, but was unsure if this needed to be continued.   We discussed that this was likely prescribed for as needed use for symptomatic worsening/COPD exacerbations.   We discussed the risk with frequent use of QTC prolongation.   · Ordered EKG or QTC monitoring.   · Ordered single refill, and recommended use only for significant symptomatic worsening (shortness of breath, increased phlegm production, phlegm coloration changes). Recommend to seek further evaluation as needed for worsening of symptoms.     · Will consider repeat chest xray at the next visit.   Patient states he was recommended to avoid further CT imaging/higher dose radiation due to concern of worsening nodularity        Vaccinations: Recommend updated influenza and pneumonia vaccinations.     Patient's Body mass index is 29.02 kg/m². BMI is above normal parameters. Recommendations include: nutrition counseling.      Return in about 3 months (around 1/7/2021), or as needed.

## 2020-10-09 RX ORDER — AZITHROMYCIN 250 MG/1
TABLET, FILM COATED ORAL
Qty: 6 TABLET | Refills: 0 | Status: SHIPPED | OUTPATIENT
Start: 2020-10-09 | End: 2021-01-21

## 2020-10-09 RX ORDER — ALBUTEROL SULFATE 90 UG/1
2 AEROSOL, METERED RESPIRATORY (INHALATION) EVERY 4 HOURS PRN
Qty: 18 G | Refills: 8 | Status: SHIPPED | OUTPATIENT
Start: 2020-10-09 | End: 2021-04-01 | Stop reason: SDUPTHER

## 2020-10-10 PROBLEM — Z87.891 FORMER SMOKER: Status: ACTIVE | Noted: 2020-10-10

## 2020-10-23 ENCOUNTER — TELEPHONE (OUTPATIENT)
Dept: PULMONOLOGY | Facility: CLINIC | Age: 70
End: 2020-10-23

## 2020-10-23 NOTE — TELEPHONE ENCOUNTER
Attempted to call patient with EKG results.  Patient was unavailable at that time.    Ordered to evaluate QTc interval as he was taking azithromycin once a month.  We discussed and agreed upon only using this as needed for symptom worsening due to its possible QTC prolongation.  He was agreeable to the plan at that time.       EKG showed sinus rhythm with occasional PACs, otherwise normal.  QTc interval was very minimally elevated at 455 ms.    Patient follows with cardiology per chart review.

## 2021-01-21 ENCOUNTER — OFFICE VISIT (OUTPATIENT)
Dept: PULMONOLOGY | Facility: CLINIC | Age: 71
End: 2021-01-21

## 2021-01-21 VITALS
OXYGEN SATURATION: 96 % | WEIGHT: 221 LBS | HEIGHT: 72 IN | HEART RATE: 79 BPM | BODY MASS INDEX: 29.93 KG/M2 | DIASTOLIC BLOOD PRESSURE: 90 MMHG | TEMPERATURE: 96.9 F | SYSTOLIC BLOOD PRESSURE: 182 MMHG

## 2021-01-21 DIAGNOSIS — J44.9 CHRONIC OBSTRUCTIVE PULMONARY DISEASE, UNSPECIFIED COPD TYPE (HCC): Primary | ICD-10-CM

## 2021-01-21 DIAGNOSIS — J60 CWP (COALWORKERS PNEUMOCONIOSIS) (HCC): ICD-10-CM

## 2021-01-21 PROCEDURE — 94664 DEMO&/EVAL PT USE INHALER: CPT | Performed by: NURSE PRACTITIONER

## 2021-01-21 PROCEDURE — 99214 OFFICE O/P EST MOD 30 MIN: CPT | Performed by: NURSE PRACTITIONER

## 2021-01-21 NOTE — PROGRESS NOTES
Have you had the Influenza Vaccine? yes    Would you like to receive this Vaccine today? no    Have you had the Pneumonia Vaccine?  yes   Would you like to receive this Vaccine today? no    Are you a current smoker? no  Quit date? 2004    Subjective    Bhavna Astorga presents for the following COPD      History of Present Illness     Mr. Astorga is a 70 year old male with a medical history significant for diabetes, coal workers pneumoconiosis and COPD.    He presents today for routine follow-up on coal workers pneumoconiosis and COPD.  He states that he has been doing well since his last visit.  He continues to report cough, shortness of breath and wheezing but reports that his symptoms are at baseline.  He states that he is currently taking Trelegy Ellipta, but reports that he feels that it is not working and that was actually making his lungs worse.  He reports that he quit taking it about 2 weeks ago and his breathing has been better since.  He is a former smoker quitting in 2004.    Review of Systems   Constitutional: Negative for activity change, fatigue and unexpected weight change.   HENT: Negative for congestion, postnasal drip and rhinorrhea.    Respiratory: Positive for cough, shortness of breath and wheezing. Negative for apnea and chest tightness.    Cardiovascular: Negative for chest pain and palpitations.   Gastrointestinal: Negative for nausea.   Allergic/Immunologic: Negative for environmental allergies.   Psychiatric/Behavioral: Negative for agitation and confusion.       Active Problems:  Problem List Items Addressed This Visit        Other    Chronic obstructive pulmonary disease (CMS/[facility]) - Primary    Relevant Medications    Budeson-Glycopyrrol-Formoterol (Breztri Aerosphere) 160-9-4.8 MCG/ACT aerosol inhaler    ipratropium-albuterol (DUO-NEB) 0.5-2.5 mg/3 ml nebulizer    Other Relevant Orders    XR Chest 2 View    Home Nebulizer    CWP (coalworkers pneumoconiosis) (CMS/[facility])    Relevant  Medications    Budeson-Glycopyrrol-Formoterol (Breztri Aerosphere) 160-9-4.8 MCG/ACT aerosol inhaler    ipratropium-albuterol (DUO-NEB) 0.5-2.5 mg/3 ml nebulizer          Past Medical History:  Past Medical History:   Diagnosis Date   • Arthritis    • Diabetes mellitus (CMS/HCC)        Family History:  Family History   Family history unknown: Yes       Social History:  Social History     Tobacco Use   • Smoking status: Former Smoker     Packs/day: 1.50     Types: Cigarettes     Start date: 1968     Quit date: 2004     Years since quittin.0   • Smokeless tobacco: Never Used   Substance Use Topics   • Alcohol use: No       Current Medications:  Current Outpatient Medications   Medication Sig Dispense Refill   • ACCU-CHEK SMARTVIEW test strip TEST ONCE DAILY  0   • albuterol sulfate  (90 Base) MCG/ACT inhaler Inhale 2 puffs Every 4 (Four) Hours As Needed for Wheezing or Shortness of Air. 18 g 8   • amitriptyline (ELAVIL) 50 MG tablet   0   • aspirin 81 MG EC tablet Take 81 mg by mouth Daily.     • cholecalciferol (VITAMIN D3) 1000 units tablet Take 1,000 Units by mouth Daily.     • clopidogrel (PLAVIX) 75 MG tablet   0   • dapagliflozin (FARXIGA) 5 MG tablet tablet Take 5 mg by mouth Daily.     • esomeprazole (nexIUM) 20 MG capsule Take 20 mg by mouth Every Morning Before Breakfast.     • Fluzone High-Dose Quadrivalent 0.7 ML suspension prefilled syringe ADM 0.7ML IM UTD     • gabapentin (NEURONTIN) 300 MG capsule TK ONE C PO BID     • LORazepam (ATIVAN) 1 MG tablet TAKE 1 TABLET BY MOUTH AT BEDTIME. MUST LAST 30 DAYS  0   • losartan (COZAAR) 25 MG tablet Take  by mouth Daily.     • orphenadrine (NORFLEX) 100 MG 12 hr tablet   0   • rosuvastatin (CRESTOR) 40 MG tablet Take  by mouth Daily.     • SITagliptin-MetFORMIN HCl ER (JANUMET XR) 100-1000 MG tablet Take 1 tablet by mouth Daily.     • tamsulosin (FLOMAX) 0.4 MG capsule 24 hr capsule TK 1 C PO QD     • traZODone (DESYREL) 50 MG tablet TK 1 T PO  "HS     • vitamin D (ERGOCALCIFEROL) 1.25 MG (22098 UT) capsule capsule TK ONE C PO Q WEEK     • azithromycin (ZITHROMAX) 250 MG tablet Take 2 by mouth today then 1 daily for 4 days 6 tablet 0   • Budeson-Glycopyrrol-Formoterol (Breztri Aerosphere) 160-9-4.8 MCG/ACT aerosol inhaler Inhale 2 puffs 2 (Two) Times a Day. 10.7 g 5   • ipratropium-albuterol (DUO-NEB) 0.5-2.5 mg/3 ml nebulizer Take 3 mL by nebulization 4 (Four) Times a Day As Needed for Wheezing. 120 mL 5     No current facility-administered medications for this visit.        Allergies:  No Known Allergies    Vitals:  BP (!) 182/90   Pulse 79   Temp 96.9 °F (36.1 °C) (Temporal)   Ht 182.9 cm (72\")   Wt 100 kg (221 lb)   SpO2 96%   BMI 29.97 kg/m²     Imaging:    Imaging Results (Most Recent)     None          Pulmonary Functions Testing Results:    FEV1   Date Value Ref Range Status   07/16/2019 2.01 liters Final     FVC   Date Value Ref Range Status   07/16/2019 3.29 liters Final       Results for orders placed or performed in visit on 07/16/19   Pulmonary Function Test   Result Value Ref Range    FEV1 2.01 liters    FVC 3.29 liters       Objective   Physical Exam   GENERAL APPEARANCE: Well developed, well nourished, alert and cooperative, and appears to be in no acute distress.    HEAD: normocephalic. Atraumatic.    EYES: PERRL, EOMI. Vision is grossly intact.    THROAT: Oral cavity and pharynx normal. No inflammation, swelling, exudate, or lesions.     NECK: Neck supple.  No thyromegaly.    CARDIAC: Normal S1 and S2. No S3, S4 or murmurs. Rhythm is regular.     RESPIRATORY:Bilateral air entry positive. Bilateral diminished breath sounds. No wheezing, crackles or rhonchi noted.    GI: Positive bowel sounds. Soft, nondistended, nontender.     MUSCULOSKELETAL: No significant deformity or joint abnormality. No edema. Peripheral pulses intact. No varicosities.    NEUROLOGICAL: Strength and sensation symmetric and intact throughout.     PSYCHIATRIC: The " mental examination revealed the patient was oriented to person, place, and time.       Assessment/Plan      COPD, Coal miners pneumococcosis, Former smoker:   -Continue albuterol inhaler every 4 hours as needed.  -We will change his Trelegy Ellipta had to Breztri 2 puffs BID.  Also provided him with a spacer.  Inhalation education was provided.       - Inhaler technique demonstration/discussion:  I have extensively discussed the steps.  In summary, the steps were discussed in the following order.Patient was advised to rinse the mouth after steroid inhalation to prevent fungal mucositis.  · Remove the cap from the inhaler and shake well.    · Breathe out all the way.    · Place the mouthpiece of the inhaler between your teeth and seal your lips tightly around it.    · As you start to breathe in slowly, press down on the canister one time.   · Keep breathing in as slowly and deeply as you can.    · It should take about 5 seconds for you to completely breathe in.    · Hold your breath for 10 seconds(count to 10 slowly) to allow the medication to reach the airways of the lung.    · Repeat the above steps for each puff.    · Wait about 1 minute between the puffs.    · Replaced the cap on the inhaler when finished.      -Ordered a chest xray.  -Will also start him on duonebs every 4 hours as needed.      Patient's Body mass index is 29.97 kg/m². BMI is above normal parameters. Recommendations include: exercise counseling and nutrition counseling.          ICD-10-CM ICD-9-CM   1. Chronic obstructive pulmonary disease, unspecified COPD type (CMS/[facility])  J44.9 496   2. CWP (coalworkers pneumoconiosis) (CMS/[facility])  J60 500       No follow-ups on file.

## 2021-01-22 RX ORDER — IPRATROPIUM BROMIDE AND ALBUTEROL SULFATE 2.5; .5 MG/3ML; MG/3ML
3 SOLUTION RESPIRATORY (INHALATION) 4 TIMES DAILY PRN
Qty: 120 ML | Refills: 5 | Status: SHIPPED | OUTPATIENT
Start: 2021-01-22 | End: 2022-03-29 | Stop reason: SDUPTHER

## 2021-01-22 RX ORDER — AZITHROMYCIN 250 MG/1
TABLET, FILM COATED ORAL
Qty: 6 TABLET | Refills: 0 | Status: SHIPPED | OUTPATIENT
Start: 2021-01-22 | End: 2021-06-14

## 2021-04-01 RX ORDER — ALBUTEROL SULFATE 90 UG/1
2 AEROSOL, METERED RESPIRATORY (INHALATION) EVERY 4 HOURS PRN
Qty: 18 G | Refills: 8 | Status: SHIPPED | OUTPATIENT
Start: 2021-04-01 | End: 2021-06-10 | Stop reason: SDUPTHER

## 2021-06-10 ENCOUNTER — OFFICE VISIT (OUTPATIENT)
Dept: PULMONOLOGY | Facility: CLINIC | Age: 71
End: 2021-06-10

## 2021-06-10 VITALS
SYSTOLIC BLOOD PRESSURE: 138 MMHG | DIASTOLIC BLOOD PRESSURE: 82 MMHG | TEMPERATURE: 96.9 F | OXYGEN SATURATION: 95 % | HEIGHT: 72 IN | WEIGHT: 216 LBS | BODY MASS INDEX: 29.26 KG/M2 | HEART RATE: 84 BPM

## 2021-06-10 DIAGNOSIS — J44.9 CHRONIC OBSTRUCTIVE PULMONARY DISEASE, UNSPECIFIED COPD TYPE (HCC): Primary | ICD-10-CM

## 2021-06-10 DIAGNOSIS — Z87.891 FORMER SMOKER: ICD-10-CM

## 2021-06-10 DIAGNOSIS — J31.0 CHRONIC RHINITIS: ICD-10-CM

## 2021-06-10 DIAGNOSIS — J60 CWP (COALWORKERS PNEUMOCONIOSIS) (HCC): ICD-10-CM

## 2021-06-10 PROCEDURE — 99214 OFFICE O/P EST MOD 30 MIN: CPT | Performed by: PHYSICIAN ASSISTANT

## 2021-06-10 RX ORDER — PREDNISONE 20 MG/1
40 TABLET ORAL DAILY
Qty: 10 TABLET | Refills: 0 | Status: SHIPPED | OUTPATIENT
Start: 2021-06-10 | End: 2021-10-14 | Stop reason: SDUPTHER

## 2021-06-10 RX ORDER — FLUTICASONE PROPIONATE 50 MCG
2 SPRAY, SUSPENSION (ML) NASAL DAILY PRN
Qty: 16 G | Refills: 5 | Status: ON HOLD | OUTPATIENT
Start: 2021-06-10 | End: 2021-11-16

## 2021-06-10 RX ORDER — ALBUTEROL SULFATE 90 UG/1
2 AEROSOL, METERED RESPIRATORY (INHALATION) EVERY 4 HOURS PRN
Qty: 18 G | Refills: 8 | Status: SHIPPED | OUTPATIENT
Start: 2021-06-10 | End: 2022-03-29 | Stop reason: SDUPTHER

## 2021-06-10 NOTE — PROGRESS NOTES
Interval history since last visit: Stable symptoms.    Recent hospitalizations: none    Investigations (imaging, PFT's, labs, sleep study, record requests, etc.)    Have you had the Influenza Vaccine? yes      Have you had the Pneumonia Vaccine?  yes       Subjective    Bhavna Astorga presents for the following COPD      History of Present Illness     Patient presents today for follow-up of COPD, coal workers nocardiosis, former smoking history.  He states that the symptoms remain at baseline at this time.  He notes shortness of breath that is minimal at rest and increased by exertion.  He rests as needed in between daily activities to avoid severe symptoms and uses the albuterol inhaler as needed.  Albuterol is used as much as 5-6 times a day.  He is no longer using the previously ordered breztri inhaler, and not currently using any other maintenance inhalers.  He notes occasional phlegm production.  No acute worsening at this time.   He has not previously qualified for supplemental oxygen during the daytime and nighttime.  He has received his COVID-19 vaccinations.  He is notable for former smoking history 16 years prior.  He reports being notable for chronic rhinorrhea despite trials of antihistamines.    Review of Systems   Constitutional: Negative for activity change, chills and fever.   HENT: Positive for postnasal drip and rhinorrhea. Negative for congestion.    Respiratory: Negative for cough, shortness of breath and wheezing.    Cardiovascular: Negative for chest pain.       Active Problems:  Problem List Items Addressed This Visit        ENT    Chronic rhinitis       Pulmonary and Pneumonias    Chronic obstructive pulmonary disease (CMS/HCC) - Primary    Relevant Medications    tiotropium bromide-olodaterol (STIOLTO RESPIMAT) 2.5-2.5 MCG/ACT aerosol solution inhaler    fluticasone (Flonase) 50 MCG/ACT nasal spray    predniSONE (DELTASONE) 20 MG tablet    albuterol sulfate  (90 Base) MCG/ACT inhaler     CWP (coalworkers pneumoconiosis) (CMS/[facility])    Relevant Medications    tiotropium bromide-olodaterol (STIOLTO RESPIMAT) 2.5-2.5 MCG/ACT aerosol solution inhaler    fluticasone (Flonase) 50 MCG/ACT nasal spray    albuterol sulfate  (90 Base) MCG/ACT inhaler       Tobacco    Former smoker          Past Medical History:  Past Medical History:   Diagnosis Date   • Arthritis    • Diabetes mellitus (CMS/[facility])        Family History:  Family History   Family history unknown: Yes       Social History:  Social History     Tobacco Use   • Smoking status: Former Smoker     Packs/day: 1.50     Types: Cigarettes     Start date: 1968     Quit date: 2004     Years since quittin.4   • Smokeless tobacco: Never Used   Substance Use Topics   • Alcohol use: No       Current Medications:  Current Outpatient Medications   Medication Sig Dispense Refill   • ACCU-CHEK SMARTVIEW test strip TEST ONCE DAILY  0   • albuterol sulfate  (90 Base) MCG/ACT inhaler Inhale 2 puffs Every 4 (Four) Hours As Needed for Wheezing or Shortness of Air. 18 g 8   • dapagliflozin (FARXIGA) 5 MG tablet tablet Take 5 mg by mouth Daily.     • gabapentin (NEURONTIN) 300 MG capsule TK ONE C PO BID     • losartan (COZAAR) 25 MG tablet Take  by mouth Daily.     • rosuvastatin (CRESTOR) 40 MG tablet Take  by mouth Daily.     • SITagliptin-MetFORMIN HCl ER (JANUMET XR) 100-1000 MG tablet Take 1 tablet by mouth Daily.     • tamsulosin (FLOMAX) 0.4 MG capsule 24 hr capsule TK 1 C PO QD     • tiotropium bromide-olodaterol (STIOLTO RESPIMAT) 2.5-2.5 MCG/ACT aerosol solution inhaler Inhale Daily.     • traZODone (DESYREL) 50 MG tablet TK 1 T PO HS     • amitriptyline (ELAVIL) 50 MG tablet   0   • aspirin 81 MG EC tablet Take 81 mg by mouth Daily.     • cholecalciferol (VITAMIN D3) 1000 units tablet Take 1,000 Units by mouth Daily.     • clopidogrel (PLAVIX) 75 MG tablet   0   • esomeprazole (nexIUM) 20 MG capsule Take 20 mg by mouth Every Morning  "Before Breakfast.     • fluticasone (Flonase) 50 MCG/ACT nasal spray 2 sprays into the nostril(s) as directed by provider Daily As Needed for Rhinitis or Allergies. 16 g 5   • Fluzone High-Dose Quadrivalent 0.7 ML suspension prefilled syringe ADM 0.7ML IM UTD     • ipratropium-albuterol (DUO-NEB) 0.5-2.5 mg/3 ml nebulizer Take 3 mL by nebulization 4 (Four) Times a Day As Needed for Wheezing. 120 mL 5   • LORazepam (ATIVAN) 1 MG tablet TAKE 1 TABLET BY MOUTH AT BEDTIME. MUST LAST 30 DAYS  0   • orphenadrine (NORFLEX) 100 MG 12 hr tablet   0   • predniSONE (DELTASONE) 20 MG tablet Take 2 tablets by mouth Daily. 10 tablet 0   • vitamin D (ERGOCALCIFEROL) 1.25 MG (37693 UT) capsule capsule TK ONE C PO Q WEEK       No current facility-administered medications for this visit.       Allergies:  No Known Allergies    Vitals:  /82   Pulse 84   Temp 96.9 °F (36.1 °C) (Temporal)   Ht 182.9 cm (72\")   Wt 98 kg (216 lb)   SpO2 95%   BMI 29.29 kg/m²     Imaging:    Imaging Results (Most Recent)     None          Pulmonary Functions Testing Results:    FEV1   Date Value Ref Range Status   07/16/2019 2.01 liters Final     FVC   Date Value Ref Range Status   07/16/2019 3.29 liters Final       Results for orders placed or performed in visit on 07/16/19   Pulmonary Function Test   Result Value Ref Range    FEV1 2.01 liters    FVC 3.29 liters       Objective   Physical Exam  Vitals reviewed.   Constitutional:       General: He is not in acute distress.     Appearance: He is well-developed. He is not diaphoretic.   HENT:      Head: Normocephalic and atraumatic.   Neck:      Thyroid: No thyromegaly.   Cardiovascular:      Rate and Rhythm: Normal rate and regular rhythm.      Heart sounds: Normal heart sounds, S1 normal and S2 normal.   Pulmonary:      Effort: Pulmonary effort is normal.      Breath sounds: No wheezing, rhonchi or rales.   Musculoskeletal:         General: No swelling.   Skin:     General: Skin is warm and " dry.   Neurological:      Mental Status: He is alert and oriented to person, place, and time.   Psychiatric:         Behavior: Behavior normal.         Assessment/Plan      I have reviewed the past medical history, family history, social history, surgical history, and allergies.     Reviewed the previous chest x-ray from January 2020.  Suggested slight increase of interstitial thickening, possibly progressive interstitial fibrosis.  Otherwise stable changes of pneumoconiosis with progressive fibrosis.     Reviewed previous spirometry report from January 2020.        ICD-10-CM ICD-9-CM   1. Chronic obstructive pulmonary disease, unspecified COPD type (CMS/[facility])  J44.9 496   2. CWP (coalworkers pneumoconiosis) (CMS/[facility])  J60 500   3. Former smoker  Z87.891 V15.82   4. Chronic rhinitis  J31.0 472.0         COPD, Coal miners pneumoconiosis, Former smoker  · Continue albuterol as needed  · Continue DuoNebs as needed  · No longer using Breztri.   Did not tolerate Trelegy, noticed worsening of symptoms.   · Started on Stiolto 1-2 puffs daily.   Sample provided.   · Ordered short course of prednisone for minimal wheezing.   We will step up inhaler therapy, and will consider Breztri again if needed  · Patient preferred to await chest x-ray imaging today and repeat imaging if needed for increased shortness of breath or consider at next visit.  · Preferred to await walk oximetry test today.  Did not qualify for walk test at the last visit.  Saturation noted at 95% on room air today.  · Declined overnight pulse oximetry testing.      Chronic Rhinitis:  · Ordered Flonase for as needed use  · Recommended continued use of antihistamine as needed.      Return in about 6 months (around 12/10/2021), or as needed.

## 2021-06-14 PROBLEM — J31.0 CHRONIC RHINITIS: Status: ACTIVE | Noted: 2021-06-14

## 2021-10-14 ENCOUNTER — TELEPHONE (OUTPATIENT)
Dept: PULMONOLOGY | Facility: CLINIC | Age: 71
End: 2021-10-14

## 2021-10-14 DIAGNOSIS — J44.1 COPD WITH ACUTE EXACERBATION (HCC): Primary | ICD-10-CM

## 2021-10-14 RX ORDER — PREDNISONE 20 MG/1
40 TABLET ORAL DAILY
Qty: 10 TABLET | Refills: 0 | Status: SHIPPED | OUTPATIENT
Start: 2021-10-14 | End: 2021-11-16

## 2021-10-14 RX ORDER — AZITHROMYCIN 250 MG/1
TABLET, FILM COATED ORAL
Qty: 6 TABLET | Refills: 0 | Status: SHIPPED | OUTPATIENT
Start: 2021-10-14 | End: 2021-11-16

## 2021-10-14 NOTE — TELEPHONE ENCOUNTER
Patient called office this morning stating that he was more short of breath and noted some chest tightness.  Upon returning the call, his wife answered the phone.  She stated that he had been noticing increased shortness of breath over the last 2 weeks.  He was having difficulty ambulating room to room today due to shortness of breath, and was requiring more frequent use of the nebulizer treatments.  He was using other scheduled therapies as directed.    · Ordered 5-day course of oral prednisone  · Ordered 5-day course of azithromycin  · Recommended to continue other previously prescribed therapies as directed.  · Discussed if no improvement noted to contact our office Friday or seek further evaluation as needed.

## 2021-11-16 ENCOUNTER — APPOINTMENT (OUTPATIENT)
Dept: GENERAL RADIOLOGY | Facility: HOSPITAL | Age: 71
End: 2021-11-16

## 2021-11-16 ENCOUNTER — HOSPITAL ENCOUNTER (OUTPATIENT)
Dept: GENERAL RADIOLOGY | Facility: HOSPITAL | Age: 71
Discharge: HOME | End: 2021-11-16

## 2021-11-16 ENCOUNTER — HOSPITAL ENCOUNTER (OUTPATIENT)
Dept: RESPIRATORY THERAPY | Facility: HOSPITAL | Age: 71
Discharge: HOME | End: 2021-11-16

## 2021-11-16 ENCOUNTER — OFFICE VISIT (OUTPATIENT)
Dept: PULMONOLOGY | Facility: CLINIC | Age: 71
End: 2021-11-16

## 2021-11-16 ENCOUNTER — HOSPITAL ENCOUNTER (INPATIENT)
Facility: HOSPITAL | Age: 71
LOS: 2 days | Discharge: HOME | End: 2021-11-18
Attending: EMERGENCY MEDICINE | Admitting: INTERNAL MEDICINE

## 2021-11-16 VITALS
OXYGEN SATURATION: 95 % | SYSTOLIC BLOOD PRESSURE: 160 MMHG | HEIGHT: 72 IN | BODY MASS INDEX: 27.9 KG/M2 | DIASTOLIC BLOOD PRESSURE: 110 MMHG | TEMPERATURE: 96.8 F | HEART RATE: 134 BPM | WEIGHT: 206 LBS

## 2021-11-16 DIAGNOSIS — I48.91 ATRIAL FIBRILLATION WITH RVR (HCC): Primary | ICD-10-CM

## 2021-11-16 DIAGNOSIS — I48.91 NEW ONSET ATRIAL FIBRILLATION (HCC): ICD-10-CM

## 2021-11-16 DIAGNOSIS — R06.00 DYSPNEA, UNSPECIFIED TYPE: ICD-10-CM

## 2021-11-16 DIAGNOSIS — Z87.891 FORMER SMOKER: ICD-10-CM

## 2021-11-16 DIAGNOSIS — J44.1 CHRONIC OBSTRUCTIVE PULMONARY DISEASE WITH ACUTE EXACERBATION (HCC): Primary | ICD-10-CM

## 2021-11-16 DIAGNOSIS — J60 CWP (COALWORKERS PNEUMOCONIOSIS) (HCC): ICD-10-CM

## 2021-11-16 LAB
ALBUMIN SERPL-MCNC: 4.34 G/DL (ref 3.5–5.2)
ALBUMIN/GLOB SERPL: 1.3 G/DL
ALP SERPL-CCNC: 59 U/L (ref 39–117)
ALT SERPL W P-5'-P-CCNC: 20 U/L (ref 1–41)
ANION GAP SERPL CALCULATED.3IONS-SCNC: 16.1 MMOL/L (ref 5–15)
APTT PPP: 35 SECONDS (ref 25.5–35.4)
AST SERPL-CCNC: 28 U/L (ref 1–40)
BASOPHILS # BLD AUTO: 0.02 10*3/MM3 (ref 0–0.2)
BASOPHILS NFR BLD AUTO: 0.3 % (ref 0–1.5)
BILIRUB SERPL-MCNC: 0.3 MG/DL (ref 0–1.2)
BUN SERPL-MCNC: 15 MG/DL (ref 8–23)
BUN/CREAT SERPL: 20 (ref 7–25)
CALCIUM SPEC-SCNC: 9.8 MG/DL (ref 8.6–10.5)
CHLORIDE SERPL-SCNC: 101 MMOL/L (ref 98–107)
CO2 SERPL-SCNC: 22.9 MMOL/L (ref 22–29)
CREAT SERPL-MCNC: 0.75 MG/DL (ref 0.76–1.27)
DEPRECATED RDW RBC AUTO: 47.8 FL (ref 37–54)
EOSINOPHIL # BLD AUTO: 0.04 10*3/MM3 (ref 0–0.4)
EOSINOPHIL NFR BLD AUTO: 0.7 % (ref 0.3–6.2)
ERYTHROCYTE [DISTWIDTH] IN BLOOD BY AUTOMATED COUNT: 15.2 % (ref 12.3–15.4)
FLUAV RNA RESP QL NAA+PROBE: NOT DETECTED
FLUBV RNA RESP QL NAA+PROBE: NOT DETECTED
GFR SERPL CREATININE-BSD FRML MDRD: 102 ML/MIN/1.73
GLOBULIN UR ELPH-MCNC: 3.4 GM/DL
GLUCOSE BLDC GLUCOMTR-MCNC: 132 MG/DL (ref 70–130)
GLUCOSE SERPL-MCNC: 137 MG/DL (ref 65–99)
HCT VFR BLD AUTO: 44.8 % (ref 37.5–51)
HGB BLD-MCNC: 13.6 G/DL (ref 13–17.7)
IMM GRANULOCYTES # BLD AUTO: 0.01 10*3/MM3 (ref 0–0.05)
IMM GRANULOCYTES NFR BLD AUTO: 0.2 % (ref 0–0.5)
INR PPP: 0.97 (ref 0.9–1.1)
LYMPHOCYTES # BLD AUTO: 2.03 10*3/MM3 (ref 0.7–3.1)
LYMPHOCYTES NFR BLD AUTO: 34.2 % (ref 19.6–45.3)
MAGNESIUM SERPL-MCNC: 1.7 MG/DL (ref 1.6–2.4)
MAGNESIUM SERPL-MCNC: 1.7 MG/DL (ref 1.6–2.4)
MCH RBC QN AUTO: 26.1 PG (ref 26.6–33)
MCHC RBC AUTO-ENTMCNC: 30.4 G/DL (ref 31.5–35.7)
MCV RBC AUTO: 85.8 FL (ref 79–97)
MONOCYTES # BLD AUTO: 0.84 10*3/MM3 (ref 0.1–0.9)
MONOCYTES NFR BLD AUTO: 14.1 % (ref 5–12)
NEUTROPHILS NFR BLD AUTO: 3 10*3/MM3 (ref 1.7–7)
NEUTROPHILS NFR BLD AUTO: 50.5 % (ref 42.7–76)
NRBC BLD AUTO-RTO: 0 /100 WBC (ref 0–0.2)
NT-PROBNP SERPL-MCNC: 1436 PG/ML (ref 0–900)
PLATELET # BLD AUTO: 303 10*3/MM3 (ref 140–450)
PMV BLD AUTO: 11 FL (ref 6–12)
POTASSIUM SERPL-SCNC: 3.7 MMOL/L (ref 3.5–5.2)
PROT SERPL-MCNC: 7.7 G/DL (ref 6–8.5)
PROTHROMBIN TIME: 13.3 SECONDS (ref 12.8–14.5)
RBC # BLD AUTO: 5.22 10*6/MM3 (ref 4.14–5.8)
SARS-COV-2 RNA RESP QL NAA+PROBE: NOT DETECTED
SODIUM SERPL-SCNC: 140 MMOL/L (ref 136–145)
TROPONIN T SERPL-MCNC: <0.01 NG/ML (ref 0–0.03)
TSH SERPL DL<=0.05 MIU/L-ACNC: 1.54 UIU/ML (ref 0.27–4.2)
TSH SERPL DL<=0.05 MIU/L-ACNC: 1.95 UIU/ML (ref 0.27–4.2)
WBC # BLD AUTO: 5.94 10*3/MM3 (ref 3.4–10.8)

## 2021-11-16 PROCEDURE — 71046 X-RAY EXAM CHEST 2 VIEWS: CPT

## 2021-11-16 PROCEDURE — 71046 X-RAY EXAM CHEST 2 VIEWS: CPT | Performed by: RADIOLOGY

## 2021-11-16 PROCEDURE — 93005 ELECTROCARDIOGRAM TRACING: CPT | Performed by: INTERNAL MEDICINE

## 2021-11-16 PROCEDURE — 93005 ELECTROCARDIOGRAM TRACING: CPT | Performed by: PHYSICIAN ASSISTANT

## 2021-11-16 PROCEDURE — 83735 ASSAY OF MAGNESIUM: CPT | Performed by: STUDENT IN AN ORGANIZED HEALTH CARE EDUCATION/TRAINING PROGRAM

## 2021-11-16 PROCEDURE — 93010 ELECTROCARDIOGRAM REPORT: CPT | Performed by: INTERNAL MEDICINE

## 2021-11-16 PROCEDURE — 93005 ELECTROCARDIOGRAM TRACING: CPT | Performed by: EMERGENCY MEDICINE

## 2021-11-16 PROCEDURE — 84443 ASSAY THYROID STIM HORMONE: CPT | Performed by: INTERNAL MEDICINE

## 2021-11-16 PROCEDURE — 82962 GLUCOSE BLOOD TEST: CPT

## 2021-11-16 PROCEDURE — 83880 ASSAY OF NATRIURETIC PEPTIDE: CPT | Performed by: STUDENT IN AN ORGANIZED HEALTH CARE EDUCATION/TRAINING PROGRAM

## 2021-11-16 PROCEDURE — 99223 1ST HOSP IP/OBS HIGH 75: CPT | Performed by: INTERNAL MEDICINE

## 2021-11-16 PROCEDURE — 84484 ASSAY OF TROPONIN QUANT: CPT | Performed by: STUDENT IN AN ORGANIZED HEALTH CARE EDUCATION/TRAINING PROGRAM

## 2021-11-16 PROCEDURE — 99214 OFFICE O/P EST MOD 30 MIN: CPT | Performed by: PHYSICIAN ASSISTANT

## 2021-11-16 PROCEDURE — 84443 ASSAY THYROID STIM HORMONE: CPT | Performed by: NURSE PRACTITIONER

## 2021-11-16 PROCEDURE — 85730 THROMBOPLASTIN TIME PARTIAL: CPT | Performed by: STUDENT IN AN ORGANIZED HEALTH CARE EDUCATION/TRAINING PROGRAM

## 2021-11-16 PROCEDURE — 84484 ASSAY OF TROPONIN QUANT: CPT | Performed by: INTERNAL MEDICINE

## 2021-11-16 PROCEDURE — 25010000002 ENOXAPARIN PER 10 MG: Performed by: INTERNAL MEDICINE

## 2021-11-16 PROCEDURE — 87636 SARSCOV2 & INF A&B AMP PRB: CPT | Performed by: STUDENT IN AN ORGANIZED HEALTH CARE EDUCATION/TRAINING PROGRAM

## 2021-11-16 PROCEDURE — 83735 ASSAY OF MAGNESIUM: CPT | Performed by: INTERNAL MEDICINE

## 2021-11-16 PROCEDURE — 80053 COMPREHEN METABOLIC PANEL: CPT | Performed by: STUDENT IN AN ORGANIZED HEALTH CARE EDUCATION/TRAINING PROGRAM

## 2021-11-16 PROCEDURE — 85025 COMPLETE CBC W/AUTO DIFF WBC: CPT | Performed by: STUDENT IN AN ORGANIZED HEALTH CARE EDUCATION/TRAINING PROGRAM

## 2021-11-16 PROCEDURE — 85610 PROTHROMBIN TIME: CPT | Performed by: STUDENT IN AN ORGANIZED HEALTH CARE EDUCATION/TRAINING PROGRAM

## 2021-11-16 PROCEDURE — 99284 EMERGENCY DEPT VISIT MOD MDM: CPT

## 2021-11-16 RX ORDER — PANTOPRAZOLE SODIUM 20 MG/1
20 TABLET, DELAYED RELEASE ORAL DAILY
Status: CANCELLED | OUTPATIENT
Start: 2021-11-17

## 2021-11-16 RX ORDER — ALBUTEROL SULFATE 2.5 MG/3ML
2.5 SOLUTION RESPIRATORY (INHALATION) EVERY 4 HOURS PRN
Status: CANCELLED | OUTPATIENT
Start: 2021-11-16

## 2021-11-16 RX ORDER — BUDESONIDE, GLYCOPYRROLATE, AND FORMOTEROL FUMARATE 160; 9; 4.8 UG/1; UG/1; UG/1
2 AEROSOL, METERED RESPIRATORY (INHALATION) 2 TIMES DAILY
Qty: 1 EACH | Refills: 8 | Status: SHIPPED | OUTPATIENT
Start: 2021-11-16 | End: 2021-11-16

## 2021-11-16 RX ORDER — MAGNESIUM SULFATE HEPTAHYDRATE 40 MG/ML
4 INJECTION, SOLUTION INTRAVENOUS ONCE
Status: COMPLETED | OUTPATIENT
Start: 2021-11-17 | End: 2021-11-17

## 2021-11-16 RX ORDER — BUDESONIDE, GLYCOPYRROLATE, AND FORMOTEROL FUMARATE 160; 9; 4.8 UG/1; UG/1; UG/1
2 AEROSOL, METERED RESPIRATORY (INHALATION) 2 TIMES DAILY
Qty: 1 EACH | Refills: 8 | Status: SHIPPED | OUTPATIENT
Start: 2021-11-16 | End: 2022-03-29 | Stop reason: SDUPTHER

## 2021-11-16 RX ORDER — PREDNISONE 20 MG/1
40 TABLET ORAL
Status: DISCONTINUED | OUTPATIENT
Start: 2021-11-17 | End: 2021-11-18 | Stop reason: HOSPADM

## 2021-11-16 RX ORDER — DOXYCYCLINE 100 MG/1
100 CAPSULE ORAL EVERY 12 HOURS SCHEDULED
Status: DISCONTINUED | OUTPATIENT
Start: 2021-11-17 | End: 2021-11-18 | Stop reason: HOSPADM

## 2021-11-16 RX ORDER — POTASSIUM CHLORIDE 20 MEQ/1
40 TABLET, EXTENDED RELEASE ORAL ONCE
Status: COMPLETED | OUTPATIENT
Start: 2021-11-16 | End: 2021-11-16

## 2021-11-16 RX ORDER — MAGNESIUM SULFATE HEPTAHYDRATE 40 MG/ML
4 INJECTION, SOLUTION INTRAVENOUS AS NEEDED
Status: DISCONTINUED | OUTPATIENT
Start: 2021-11-16 | End: 2021-11-18 | Stop reason: HOSPADM

## 2021-11-16 RX ORDER — NICOTINE POLACRILEX 4 MG
15 LOZENGE BUCCAL
Status: DISCONTINUED | OUTPATIENT
Start: 2021-11-16 | End: 2021-11-18 | Stop reason: HOSPADM

## 2021-11-16 RX ORDER — DEXTROSE MONOHYDRATE 25 G/50ML
25 INJECTION, SOLUTION INTRAVENOUS
Status: DISCONTINUED | OUTPATIENT
Start: 2021-11-16 | End: 2021-11-18 | Stop reason: HOSPADM

## 2021-11-16 RX ORDER — MAGNESIUM SULFATE HEPTAHYDRATE 40 MG/ML
2 INJECTION, SOLUTION INTRAVENOUS AS NEEDED
Status: DISCONTINUED | OUTPATIENT
Start: 2021-11-16 | End: 2021-11-18 | Stop reason: HOSPADM

## 2021-11-16 RX ORDER — PANTOPRAZOLE SODIUM 20 MG/1
20 TABLET, DELAYED RELEASE ORAL DAILY
COMMUNITY

## 2021-11-16 RX ORDER — METOPROLOL TARTRATE 5 MG/5ML
5 INJECTION INTRAVENOUS ONCE
Status: COMPLETED | OUTPATIENT
Start: 2021-11-16 | End: 2021-11-16

## 2021-11-16 RX ORDER — NITROGLYCERIN 0.4 MG/1
0.4 TABLET SUBLINGUAL
Status: DISCONTINUED | OUTPATIENT
Start: 2021-11-16 | End: 2021-11-18 | Stop reason: HOSPADM

## 2021-11-16 RX ADMIN — POTASSIUM CHLORIDE 40 MEQ: 20 TABLET, EXTENDED RELEASE ORAL at 22:23

## 2021-11-16 RX ADMIN — METOPROLOL TARTRATE 5 MG: 5 INJECTION INTRAVENOUS at 20:30

## 2021-11-16 RX ADMIN — ENOXAPARIN SODIUM 90 MG: 100 INJECTION SUBCUTANEOUS at 22:24

## 2021-11-16 NOTE — PROGRESS NOTES
"Chief Complaint  COPD    Subjective          Bhavna Astorga presents to [facility] PULMONARY & CRITICAL CARE MEDICINE  History of Present Illness    Patient presents today for follow-up of COPD and coal workers pneumoconiosis with massive fibrosis.  He states that he was previously instructed by Dr. Fajardo to not complete any further CT scans due to the concern of continued radiation and malignancy risk.  He states that since receiving the COVID-19 vaccination in April, he noticed a onset of worsening shortness of breath.  He was previously able to complete more daily activities without difficulty.  He was able to walk further distances but has noticed progressive worsening of shortness of breath.  He sandy worse when laying flat.  Shortness of breath improved once he sits up at bedside.  He continues to use Ventolin or albuterol nebs as needed.  He has tried Trelegy Ellipta in the past and did not tolerate this inhaler.  He was previously doing better with use of Stiolto.  Occasional wheezing also noted.  He admits that he has been having some left-sided lateral chest wall pain, and states that it is sore in this area below the armpit.  No midsternal chest pain or radiating pain noted.      Objective   Vital Signs:   BP (!) 160/110   Pulse (!) 134   Temp 96.8 °F (36 °C) (Temporal)   Ht 182.9 cm (72\")   Wt 93.4 kg (206 lb)   SpO2 95%   BMI 27.94 kg/m²     Pulse rate checked repeatedly throughout the visit, ranging from the 110s to 130s.      Physical Exam  Vitals reviewed.   Constitutional:       General: He is not in acute distress.     Appearance: He is well-developed. He is not diaphoretic.      Comments: Notably short of breath with conversation.   HENT:      Head: Normocephalic and atraumatic.   Neck:      Thyroid: No thyromegaly.   Cardiovascular:      Rate and Rhythm: Tachycardia present. Rhythm irregular.      Heart sounds: Normal heart sounds, S1 normal and S2 normal.   Pulmonary: "      Effort: Pulmonary effort is normal.      Breath sounds: No wheezing, rhonchi or rales.      Comments: Upper expiratory faint wheezing with lower more prominent expiratory wheezing.  Neurological:      Mental Status: He is alert and oriented to person, place, and time.   Psychiatric:         Behavior: Behavior normal.             Result Review :   The following data was reviewed by: Denise Walls PA-C on 11/16/2021:    Data reviewed: Radiologic studies chest x-ray 2020 and Spirometry report 2020        Assessment and Plan    Diagnoses and all orders for this visit:    1. Chronic obstructive pulmonary disease with acute exacerbation (HCC) (Primary)    2. CWP (coalworkers pneumoconiosis) (HCC)    3. Former smoker    4. Dyspnea, unspecified type  -     ECG 12 Lead; Future  -     XR Chest 2 View; Future    5. New onset atrial fibrillation (HCC)    Other orders  -     Budeson-Glycopyrrol-Formoterol (Breztri Aerosphere) 160-9-4.8 MCG/ACT aerosol inhaler; Inhale 2 puffs 2 (Two) Times a Day.  Dispense: 1 each; Refill: 8          Concern for new onset atrial fibrillation with RVR  · Ordered EKG.   advised patient to return to the office after EKG was completed as he may need to seek further evaluation.       COPD, coal workers pneumoconiosis:  · Continue albuterol inhaler as needed  · Continue DuoNebs as needed  · Discontinuing Stiolto 1-2 puffs daily  · Restarting on breztri as patient would likely benefit more from the additional inhaled steroid  sample provided  instructed to use 2 puffs twice daily.      Former smoker:  Patient no longer qualifies for lung cancer screening CTs due to year since cessation.  Notable for an approximately 36-year history of 1.5 ppd average.  · Ordered chest x-ray        Follow Up   Return in about 3 months (around 2/16/2022), or as needed, for Recheck.  Patient was given instructions and counseling regarding his condition or for health maintenance advice. Please see specific  information pulled into the AVS if appropriate.       Addendum: Patient returned and chest x-ray in EKG reviewed.  EKG suggestive of atrial fibrillation with rate in the 120s.  Patient reports no previous history of atrial fibrillation.  Chest x-ray appeared stable with previous findings.  Advised to seek ED evaluation.  Patient may also need IV steroids due to concern of acute COPD exacerbation, but discussed that new onset arrhythmia was more urgent.  He and his wife conveyed understanding and agreed to directly seek ED evaluation.  He was stable no symptomatically to self transport he and his wife to the ED.

## 2021-11-16 NOTE — ED PROVIDER NOTES
Subjective     Chest Pain  Pain location:  Substernal area  Pain quality: aching    Duration:  1 day  Timing:  Constant  Progression:  Waxing and waning  Chronicity:  New  Context: not breathing, not drug use, not intercourse, not lifting, not raising an arm and not at rest    Relieved by:  Nothing  Worsened by:  Nothing  Ineffective treatments:  None tried  Associated symptoms: palpitations    Associated symptoms: no abdominal pain, no anxiety, no dysphagia, no fever, no nausea and no PND    Risk factors: no aortic disease, no coronary artery disease, no Tony-Danlos syndrome, not male, not obese and no smoking        Review of Systems   Constitutional: Negative.  Negative for fever.   HENT: Negative.  Negative for trouble swallowing.    Eyes: Negative.    Respiratory: Negative.    Cardiovascular: Positive for chest pain and palpitations. Negative for PND.   Gastrointestinal: Negative.  Negative for abdominal pain and nausea.   Endocrine: Negative.    Genitourinary: Negative.    Musculoskeletal: Negative.    Skin: Negative.    Allergic/Immunologic: Negative.    Neurological: Negative.    Hematological: Negative.    Psychiatric/Behavioral: Negative.        Past Medical History:   Diagnosis Date   • Arthritis    • Diabetes mellitus (HCC)        No Known Allergies    No past surgical history on file.    Family History   Family history unknown: Yes       Social History     Socioeconomic History   • Marital status:    Tobacco Use   • Smoking status: Former Smoker     Packs/day: 1.50     Types: Cigarettes     Start date: 1968     Quit date: 2004     Years since quittin.8   • Smokeless tobacco: Never Used   Vaping Use   • Vaping Use: Never used   Substance and Sexual Activity   • Alcohol use: No   • Drug use: No   • Sexual activity: Defer           Objective   Physical Exam  Vitals and nursing note reviewed.   Constitutional:       Appearance: He is well-developed.   HENT:      Head: Normocephalic.       Right Ear: External ear normal.      Left Ear: External ear normal.   Eyes:      Conjunctiva/sclera: Conjunctivae normal.      Pupils: Pupils are equal, round, and reactive to light.   Cardiovascular:      Rate and Rhythm: Normal rate and regular rhythm.      Heart sounds: Normal heart sounds.   Pulmonary:      Effort: Pulmonary effort is normal.      Breath sounds: Normal breath sounds.   Abdominal:      General: Bowel sounds are normal.      Palpations: Abdomen is soft.   Musculoskeletal:         General: Normal range of motion.      Cervical back: Normal range of motion and neck supple.   Skin:     General: Skin is warm and dry.      Capillary Refill: Capillary refill takes less than 2 seconds.   Neurological:      Mental Status: He is alert and oriented to person, place, and time.   Psychiatric:         Behavior: Behavior normal.         Thought Content: Thought content normal.         Procedures           ED Course  ED Course as of 11/16/21 2006 Tue Nov 16, 2021   1655 EKG atrial fibrillation  bpm, QRS 80, QTc 435, regular axis, no ST deviation or T wave abnormalities concerning for acute ischemia. [KP]      ED Course User Index  [KP] Abdi Tellez MD                                           [facility]    Final diagnoses:   Atrial fibrillation with RVR ([facility])       ED Disposition  ED Disposition     ED Disposition Condition Comment    Decision to Admit            No follow-up provider specified.       Medication List      New Prescriptions    Breztri Aerosphere 160-9-4.8 MCG/ACT aerosol inhaler  Generic drug: Budeson-Glycopyrrol-Formoterol  Inhale 2 puffs 2 (Two) Times a Day.           Where to Get Your Medications      These medications were sent to [facility][id number] - [address] AT [facility] - [phone] [facility] [phone] [address] [address]    Phone: [phone]   · Breztri Aerosphere 160-9-4.8 MCG/ACT aerosol  inhaler          Wilmar Stafford, APRN  11/16/21 2006

## 2021-11-16 NOTE — ED NOTES
MEDICAL SCREENING:    Reason for Visit: afib rvr, cp    Patient initially seen in triage.  The patient was advised further evaluation and diagnostic testing will be needed, some of the treatment and testing will be initiated in the lobby in order to begin the process.  The patient will be returned to the waiting area for the time being and possibly be re-assessed by a subsequent ED provider.  The patient will be brought back to the treatment area in as timely manner as possible.    Electronically signed by Abdi Tellez MD, 11/16/21, 4:57 PM EST.       Abdi Tellez MD  11/16/21 8152       Abdi Tellez MD  11/16/21 0545

## 2021-11-17 ENCOUNTER — APPOINTMENT (OUTPATIENT)
Dept: CARDIOLOGY | Facility: HOSPITAL | Age: 71
End: 2021-11-17

## 2021-11-17 PROBLEM — I10 PRIMARY HYPERTENSION: Status: ACTIVE | Noted: 2021-11-17

## 2021-11-17 LAB
ALBUMIN SERPL-MCNC: 4.04 G/DL (ref 3.5–5.2)
ALBUMIN/GLOB SERPL: 1.3 G/DL
ALP SERPL-CCNC: 56 U/L (ref 39–117)
ALT SERPL W P-5'-P-CCNC: 17 U/L (ref 1–41)
ANION GAP SERPL CALCULATED.3IONS-SCNC: 16.2 MMOL/L (ref 5–15)
AST SERPL-CCNC: 25 U/L (ref 1–40)
BASOPHILS # BLD AUTO: 0.04 10*3/MM3 (ref 0–0.2)
BASOPHILS NFR BLD AUTO: 0.6 % (ref 0–1.5)
BH CV ECHO MEAS - AO ROOT AREA (BSA CORRECTED): 1.4
BH CV ECHO MEAS - AO ROOT AREA: 7.1 CM^2
BH CV ECHO MEAS - AO ROOT DIAM: 3 CM
BH CV ECHO MEAS - BSA(HAYCOCK): 2.2 M^2
BH CV ECHO MEAS - BSA: 2.1 M^2
BH CV ECHO MEAS - BZI_BMI: 27 KILOGRAMS/M^2
BH CV ECHO MEAS - BZI_METRIC_HEIGHT: 182.9 CM
BH CV ECHO MEAS - BZI_METRIC_WEIGHT: 90.3 KG
BH CV ECHO MEAS - EDV(CUBED): 50.7 ML
BH CV ECHO MEAS - EDV(MOD-SP4): 43.7 ML
BH CV ECHO MEAS - EDV(TEICH): 58.1 ML
BH CV ECHO MEAS - EF(CUBED): 22.4 %
BH CV ECHO MEAS - EF(MOD-SP4): 65.2 %
BH CV ECHO MEAS - EF(TEICH): 18.4 %
BH CV ECHO MEAS - ESV(CUBED): 39.3 ML
BH CV ECHO MEAS - ESV(MOD-SP4): 15.2 ML
BH CV ECHO MEAS - ESV(TEICH): 47.4 ML
BH CV ECHO MEAS - FS: 8.1 %
BH CV ECHO MEAS - IVS/LVPW: 1.2
BH CV ECHO MEAS - IVSD: 1.5 CM
BH CV ECHO MEAS - LA DIMENSION: 4.9 CM
BH CV ECHO MEAS - LA/AO: 1.6
BH CV ECHO MEAS - LV DIASTOLIC VOL/BSA (35-75): 20.6 ML/M^2
BH CV ECHO MEAS - LV MASS(C)D: 186.9 GRAMS
BH CV ECHO MEAS - LV MASS(C)DI: 87.9 GRAMS/M^2
BH CV ECHO MEAS - LV SYSTOLIC VOL/BSA (12-30): 7.2 ML/M^2
BH CV ECHO MEAS - LVIDD: 3.7 CM
BH CV ECHO MEAS - LVIDS: 3.4 CM
BH CV ECHO MEAS - LVLD AP4: 7.2 CM
BH CV ECHO MEAS - LVLS AP4: 6.1 CM
BH CV ECHO MEAS - LVOT AREA (M): 3.8 CM^2
BH CV ECHO MEAS - LVOT AREA: 3.8 CM^2
BH CV ECHO MEAS - LVOT DIAM: 2.2 CM
BH CV ECHO MEAS - LVPWD: 1.3 CM
BH CV ECHO MEAS - MV A MAX VEL: 30.8 CM/SEC
BH CV ECHO MEAS - MV E MAX VEL: 100 CM/SEC
BH CV ECHO MEAS - MV E/A: 3.2
BH CV ECHO MEAS - PA ACC TIME: 0.15 SEC
BH CV ECHO MEAS - PA PR(ACCEL): 11.1 MMHG
BH CV ECHO MEAS - SI(CUBED): 5.3 ML/M^2
BH CV ECHO MEAS - SI(MOD-SP4): 13.4 ML/M^2
BH CV ECHO MEAS - SI(TEICH): 5 ML/M^2
BH CV ECHO MEAS - SV(CUBED): 11.3 ML
BH CV ECHO MEAS - SV(MOD-SP4): 28.5 ML
BH CV ECHO MEAS - SV(TEICH): 10.7 ML
BILIRUB SERPL-MCNC: 0.4 MG/DL (ref 0–1.2)
BUN SERPL-MCNC: 15 MG/DL (ref 8–23)
BUN/CREAT SERPL: 20.3 (ref 7–25)
CALCIUM SPEC-SCNC: 9.9 MG/DL (ref 8.6–10.5)
CHLORIDE SERPL-SCNC: 102 MMOL/L (ref 98–107)
CO2 SERPL-SCNC: 20.8 MMOL/L (ref 22–29)
CREAT SERPL-MCNC: 0.74 MG/DL (ref 0.76–1.27)
DEPRECATED RDW RBC AUTO: 48.6 FL (ref 37–54)
EOSINOPHIL # BLD AUTO: 0.02 10*3/MM3 (ref 0–0.4)
EOSINOPHIL NFR BLD AUTO: 0.3 % (ref 0.3–6.2)
ERYTHROCYTE [DISTWIDTH] IN BLOOD BY AUTOMATED COUNT: 15.3 % (ref 12.3–15.4)
GFR SERPL CREATININE-BSD FRML MDRD: 104 ML/MIN/1.73
GLOBULIN UR ELPH-MCNC: 3.2 GM/DL
GLUCOSE BLDC GLUCOMTR-MCNC: 117 MG/DL (ref 70–130)
GLUCOSE BLDC GLUCOMTR-MCNC: 173 MG/DL (ref 70–130)
GLUCOSE BLDC GLUCOMTR-MCNC: 209 MG/DL (ref 70–130)
GLUCOSE BLDC GLUCOMTR-MCNC: 257 MG/DL (ref 70–130)
GLUCOSE SERPL-MCNC: 207 MG/DL (ref 65–99)
HBA1C MFR BLD: 7.8 % (ref 4.8–5.6)
HCT VFR BLD AUTO: 43.7 % (ref 37.5–51)
HGB BLD-MCNC: 13.2 G/DL (ref 13–17.7)
IMM GRANULOCYTES # BLD AUTO: 0.01 10*3/MM3 (ref 0–0.05)
IMM GRANULOCYTES NFR BLD AUTO: 0.2 % (ref 0–0.5)
LYMPHOCYTES # BLD AUTO: 1.89 10*3/MM3 (ref 0.7–3.1)
LYMPHOCYTES NFR BLD AUTO: 30.1 % (ref 19.6–45.3)
MAXIMAL PREDICTED HEART RATE: 147 BPM
MCH RBC QN AUTO: 26.4 PG (ref 26.6–33)
MCHC RBC AUTO-ENTMCNC: 30.2 G/DL (ref 31.5–35.7)
MCV RBC AUTO: 87.4 FL (ref 79–97)
MONOCYTES # BLD AUTO: 0.81 10*3/MM3 (ref 0.1–0.9)
MONOCYTES NFR BLD AUTO: 12.9 % (ref 5–12)
NEUTROPHILS NFR BLD AUTO: 3.5 10*3/MM3 (ref 1.7–7)
NEUTROPHILS NFR BLD AUTO: 55.9 % (ref 42.7–76)
NRBC BLD AUTO-RTO: 0 /100 WBC (ref 0–0.2)
PLATELET # BLD AUTO: 307 10*3/MM3 (ref 140–450)
PMV BLD AUTO: 12.6 FL (ref 6–12)
POTASSIUM SERPL-SCNC: 3.8 MMOL/L (ref 3.5–5.2)
PROT SERPL-MCNC: 7.2 G/DL (ref 6–8.5)
QT INTERVAL: 318 MS
QT INTERVAL: 320 MS
QT INTERVAL: 330 MS
QT INTERVAL: 404 MS
QTC INTERVAL: 420 MS
QTC INTERVAL: 463 MS
QTC INTERVAL: 465 MS
QTC INTERVAL: 477 MS
RBC # BLD AUTO: 5 10*6/MM3 (ref 4.14–5.8)
SODIUM SERPL-SCNC: 139 MMOL/L (ref 136–145)
STRESS TARGET HR: 125 BPM
WBC # BLD AUTO: 6.27 10*3/MM3 (ref 3.4–10.8)

## 2021-11-17 PROCEDURE — 82962 GLUCOSE BLOOD TEST: CPT

## 2021-11-17 PROCEDURE — 99233 SBSQ HOSP IP/OBS HIGH 50: CPT | Performed by: INTERNAL MEDICINE

## 2021-11-17 PROCEDURE — 25010000002 ENOXAPARIN PER 10 MG: Performed by: INTERNAL MEDICINE

## 2021-11-17 PROCEDURE — 63710000001 PREDNISONE PER 1 MG: Performed by: INTERNAL MEDICINE

## 2021-11-17 PROCEDURE — 80053 COMPREHEN METABOLIC PANEL: CPT | Performed by: INTERNAL MEDICINE

## 2021-11-17 PROCEDURE — 93306 TTE W/DOPPLER COMPLETE: CPT

## 2021-11-17 PROCEDURE — 93005 ELECTROCARDIOGRAM TRACING: CPT | Performed by: INTERNAL MEDICINE

## 2021-11-17 PROCEDURE — 94799 UNLISTED PULMONARY SVC/PX: CPT

## 2021-11-17 PROCEDURE — 93010 ELECTROCARDIOGRAM REPORT: CPT | Performed by: INTERNAL MEDICINE

## 2021-11-17 PROCEDURE — 85025 COMPLETE CBC W/AUTO DIFF WBC: CPT | Performed by: INTERNAL MEDICINE

## 2021-11-17 PROCEDURE — 93306 TTE W/DOPPLER COMPLETE: CPT | Performed by: INTERNAL MEDICINE

## 2021-11-17 PROCEDURE — 94640 AIRWAY INHALATION TREATMENT: CPT

## 2021-11-17 PROCEDURE — 0 MAGNESIUM SULFATE 4 GM/100ML SOLUTION: Performed by: INTERNAL MEDICINE

## 2021-11-17 PROCEDURE — 83036 HEMOGLOBIN GLYCOSYLATED A1C: CPT | Performed by: INTERNAL MEDICINE

## 2021-11-17 RX ORDER — METOPROLOL TARTRATE 50 MG/1
50 TABLET, FILM COATED ORAL EVERY 12 HOURS SCHEDULED
Status: DISCONTINUED | OUTPATIENT
Start: 2021-11-17 | End: 2021-11-17

## 2021-11-17 RX ORDER — GABAPENTIN 300 MG/1
300 CAPSULE ORAL 2 TIMES DAILY
Status: DISCONTINUED | OUTPATIENT
Start: 2021-11-17 | End: 2021-11-18 | Stop reason: HOSPADM

## 2021-11-17 RX ORDER — PANTOPRAZOLE SODIUM 40 MG/1
40 TABLET, DELAYED RELEASE ORAL DAILY
Status: DISCONTINUED | OUTPATIENT
Start: 2021-11-17 | End: 2021-11-18 | Stop reason: HOSPADM

## 2021-11-17 RX ORDER — TAMSULOSIN HYDROCHLORIDE 0.4 MG/1
0.4 CAPSULE ORAL DAILY
Status: DISCONTINUED | OUTPATIENT
Start: 2021-11-17 | End: 2021-11-18 | Stop reason: HOSPADM

## 2021-11-17 RX ORDER — ASPIRIN 81 MG/1
81 TABLET ORAL DAILY
Status: DISCONTINUED | OUTPATIENT
Start: 2021-11-17 | End: 2021-11-18 | Stop reason: HOSPADM

## 2021-11-17 RX ORDER — IBUPROFEN 600 MG/1
600 TABLET ORAL EVERY 6 HOURS PRN
Status: DISCONTINUED | OUTPATIENT
Start: 2021-11-17 | End: 2021-11-18 | Stop reason: HOSPADM

## 2021-11-17 RX ORDER — METOPROLOL TARTRATE 50 MG/1
50 TABLET, FILM COATED ORAL EVERY 8 HOURS
Status: DISCONTINUED | OUTPATIENT
Start: 2021-11-17 | End: 2021-11-18 | Stop reason: HOSPADM

## 2021-11-17 RX ORDER — ROSUVASTATIN CALCIUM 20 MG/1
40 TABLET, COATED ORAL DAILY
Status: DISCONTINUED | OUTPATIENT
Start: 2021-11-17 | End: 2021-11-18 | Stop reason: HOSPADM

## 2021-11-17 RX ORDER — LOSARTAN POTASSIUM 25 MG/1
25 TABLET ORAL DAILY
Status: DISCONTINUED | OUTPATIENT
Start: 2021-11-17 | End: 2021-11-18 | Stop reason: HOSPADM

## 2021-11-17 RX ORDER — BUDESONIDE AND FORMOTEROL FUMARATE DIHYDRATE 160; 4.5 UG/1; UG/1
2 AEROSOL RESPIRATORY (INHALATION)
Status: DISCONTINUED | OUTPATIENT
Start: 2021-11-17 | End: 2021-11-18 | Stop reason: HOSPADM

## 2021-11-17 RX ORDER — IPRATROPIUM BROMIDE AND ALBUTEROL SULFATE 2.5; .5 MG/3ML; MG/3ML
3 SOLUTION RESPIRATORY (INHALATION) 4 TIMES DAILY PRN
Status: CANCELLED | OUTPATIENT
Start: 2021-11-17

## 2021-11-17 RX ORDER — TRAZODONE HYDROCHLORIDE 50 MG/1
50 TABLET ORAL NIGHTLY
Status: DISCONTINUED | OUTPATIENT
Start: 2021-11-17 | End: 2021-11-18 | Stop reason: HOSPADM

## 2021-11-17 RX ORDER — FUROSEMIDE 10 MG/ML
40 INJECTION INTRAMUSCULAR; INTRAVENOUS ONCE
Status: DISCONTINUED | OUTPATIENT
Start: 2021-11-17 | End: 2021-11-18 | Stop reason: HOSPADM

## 2021-11-17 RX ADMIN — ENOXAPARIN SODIUM 90 MG: 100 INJECTION SUBCUTANEOUS at 20:47

## 2021-11-17 RX ADMIN — METOPROLOL TARTRATE 25 MG: 25 TABLET, FILM COATED ORAL at 08:54

## 2021-11-17 RX ADMIN — METOPROLOL TARTRATE 50 MG: 50 TABLET, FILM COATED ORAL at 16:45

## 2021-11-17 RX ADMIN — PREDNISONE 40 MG: 20 TABLET ORAL at 08:55

## 2021-11-17 RX ADMIN — DOXYCYCLINE 100 MG: 100 CAPSULE ORAL at 00:30

## 2021-11-17 RX ADMIN — ENOXAPARIN SODIUM 90 MG: 100 INJECTION SUBCUTANEOUS at 11:56

## 2021-11-17 RX ADMIN — GABAPENTIN 300 MG: 300 CAPSULE ORAL at 20:50

## 2021-11-17 RX ADMIN — BUDESONIDE AND FORMOTEROL FUMARATE DIHYDRATE 2 PUFF: 160; 4.5 AEROSOL RESPIRATORY (INHALATION) at 18:31

## 2021-11-17 RX ADMIN — TAMSULOSIN HYDROCHLORIDE 0.4 MG: 0.4 CAPSULE ORAL at 08:55

## 2021-11-17 RX ADMIN — MAGNESIUM SULFATE IN WATER 4 G: 40 INJECTION, SOLUTION INTRAVENOUS at 02:00

## 2021-11-17 RX ADMIN — METOPROLOL TARTRATE 25 MG: 25 TABLET, FILM COATED ORAL at 00:30

## 2021-11-17 RX ADMIN — ROSUVASTATIN CALCIUM 40 MG: 20 TABLET, FILM COATED ORAL at 08:56

## 2021-11-17 RX ADMIN — METOPROLOL TARTRATE 50 MG: 50 TABLET, FILM COATED ORAL at 20:49

## 2021-11-17 RX ADMIN — ASPIRIN 81 MG: 81 TABLET, COATED ORAL at 11:55

## 2021-11-17 RX ADMIN — TRAZODONE HYDROCHLORIDE 50 MG: 50 TABLET ORAL at 20:49

## 2021-11-17 RX ADMIN — BUDESONIDE AND FORMOTEROL FUMARATE DIHYDRATE 2 PUFF: 160; 4.5 AEROSOL RESPIRATORY (INHALATION) at 07:04

## 2021-11-17 RX ADMIN — DOXYCYCLINE 100 MG: 100 CAPSULE ORAL at 08:54

## 2021-11-17 RX ADMIN — GABAPENTIN 300 MG: 300 CAPSULE ORAL at 08:55

## 2021-11-17 RX ADMIN — LOSARTAN POTASSIUM 25 MG: 25 TABLET, FILM COATED ORAL at 08:56

## 2021-11-17 RX ADMIN — METOPROLOL TARTRATE 25 MG: 25 TABLET, FILM COATED ORAL at 11:55

## 2021-11-17 RX ADMIN — PANTOPRAZOLE SODIUM 40 MG: 40 TABLET, DELAYED RELEASE ORAL at 08:55

## 2021-11-17 RX ADMIN — DOXYCYCLINE 100 MG: 100 CAPSULE ORAL at 20:49

## 2021-11-17 NOTE — H&P
[facility]   HISTORY AND PHYSICAL    Patient Name: Bhavna Astorga  : 1950  MRN: [id number]  Primary Care Physician:  Lucien Anderson MD  Date of admission: 2021    Subjective   Subjective     Chief Complaint: shortness of air, chest pain     History of Present Illness  The patient is a 72 yo male with PMHx significant for CAD s/p cardiac stenting x 4, Type II DM, HTN and COPD/Coal Worker's pneumoconiosis who presents to the ED complaining of chest pain and shortness of air.    Patient was seen in the Pulmonary clinic today where he reported progressive shortness of breath with orthopnea. He also reported left lateral chest wall pain and tenderness in the left axilla. He was noted to be in new onset atrial fibrillation with RVR (120s) and was referred to the ED for further evaluation and management. Patient denies previous known history of Atrial Fibrillation.     The patient reports a 2-week history of progressive dyspnea on exertion and severe weakness with orthopnea and occasional palpitations.  As noted above, he did report some left lateral chest wall pain and tenderness but denied any chest pressure.  The patient reports a chronic cough and states that he currently has productive sputum that is clear to white.  Patient reports a 1 to 2-month history of worsening audible wheezing. The patient states that he does not use supplemental oxygen at home.    Work-up in the emergency department revealed the patient was in atrial fibrillation with rapid ventricular response mostly in the 120s to 130s.  Patient had 2 - troponin T levels.  proBNP 1436.  CMP revealed a mildly decreased potassium level of 3.7; BUN 15 and creatinine 0.75.  Glucose 137.  CBC was largely unremarkable.    Patient has been admitted to the telemetry unit for further evaluation and management.    Review of Systems   Constitutional: Negative for chills, diaphoresis and fever.   HENT: Negative for hearing loss, tinnitus and trouble  swallowing.    Eyes: Negative for discharge and visual disturbance.   Respiratory: Positive for cough, shortness of breath and wheezing.    Cardiovascular: Positive for leg swelling (Occasional; pedal). Negative for chest pain and palpitations.   Gastrointestinal: Negative for abdominal pain, constipation, diarrhea, nausea and vomiting.   Endocrine: Negative for polydipsia, polyphagia and polyuria.   Genitourinary: Negative for dysuria, frequency and hematuria.   Musculoskeletal: Negative for gait problem, myalgias and neck pain.   Skin: Negative for rash and wound.   Neurological: Positive for weakness. Negative for dizziness, tremors, seizures, syncope and light-headedness.   Hematological: Does not bruise/bleed easily.   Psychiatric/Behavioral: Negative for confusion, hallucinations and suicidal ideas.      Personal History     Past Medical History:   Diagnosis Date   • Arthritis    • BPH (benign prostatic hyperplasia)    • CAD (coronary artery disease)    • Coal workers pneumoconiosis (HCC)    • COPD (chronic obstructive pulmonary disease) ([facility])    • Diabetes mellitus (HCC)    • GERD (gastroesophageal reflux disease)    • Neuropathy    • Vitamin D deficiency        Past Surgical History:   Procedure Laterality Date   • CORONARY STENT PLACEMENT         Family History: Family history is unknown by patient. Otherwise pertinent FHx was reviewed and not pertinent to current issue.    Social History:  reports that he quit smoking about 17 years ago. His smoking use included cigarettes. He started smoking about 53 years ago. He smoked 1.50 packs per day. He has never used smokeless tobacco. He reports that he does not drink alcohol and does not use drugs.    Home Medications:  Budeson-Glycopyrrol-Formoterol, Influenza Vac High-Dose Quad, LORazepam, SITagliptin-metFORMIN HCl ER, albuterol sulfate HFA, amitriptyline, aspirin, cholecalciferol, clopidogrel, dapagliflozin, esomeprazole, fluticasone, gabapentin, glucose  blood, ipratropium-albuterol, losartan, orphenadrine, rosuvastatin, tamsulosin, traZODone, and vitamin D    Allergies:  No Known Allergies    Objective    Objective     Vitals:   Temp:  [96.8 °F (36 °C)-97 °F (36.1 °C)] 97 °F (36.1 °C)  Heart Rate:  [] 98  Resp:  [18] 18  BP: (156-181)/() 156/103    Physical Exam  Constitutional:       General: He is not in acute distress.     Appearance: He is well-developed.      Interventions: Nasal cannula in place.   HENT:      Head: Normocephalic and atraumatic.   Eyes:      Conjunctiva/sclera: Conjunctivae normal.   Neck:      Trachea: No tracheal deviation.   Cardiovascular:      Rate and Rhythm: Normal rate. Rhythm irregularly irregular.      Pulses:           Posterior tibial pulses are 2+ on the right side and 2+ on the left side.      Heart sounds: No murmur heard.  No friction rub. No gallop.       Comments: Trace edema bilateral lower extremities  Pulmonary:      Effort: No respiratory distress.      Breath sounds: Wheezing present. No rales.   Abdominal:      General: Bowel sounds are normal. There is no distension.      Palpations: Abdomen is soft.      Tenderness: There is no abdominal tenderness. There is no guarding.   Skin:     General: Skin is warm and dry.      Findings: No erythema or rash.   Neurological:      Mental Status: He is alert and oriented to person, place, and time.      Cranial Nerves: No cranial nerve deficit.         Result Review    Result Review:  I have personally reviewed the results from the time of this admission to 11/16/2021 20:49 EST and agree with these findings:  [x]  Laboratory  []  Microbiology  [x]  Radiology  [x]  EKG/Telemetry   []  Cardiology/Vascular   []  Pathology  []  Old records  []  Other:  Most notable findings include: proBNP 1436, K 3.7. Portable chest xray with stable mass-like opacities bilateral heath-hilar and upper lobes compatible with progressive massive fibrosis and development of interstitial  thickening likely edema    EKG: Atrial fibrillation with rapid ventricular response per my view; pending cardiology read    Assessment/Plan   Assessment / Plan     Brief Patient Summary:  Bhavna Astorga is a 71 y.o. male who presents with new onset atrial fibrillation with rapid response, presenting with progressive dyspnea on exertion, weakness and occasional palpitations.    Active Hospital Problems:  -New onset atrial fibrillation with rapid ventricular response; [id number]  -Elevated proBNP, likely due to above  -Coronary artery disease status post remote stenting  -Essential hypertension, uncontrolled  -DM type II  -COPD with mild acute exacerbation    Chronic medical problems:  -Coal worker's pneumoconiosis with fibrosis  -History of tobacco use  -Neuropathy  -GERD    Plan:     Patient has been admitted to the telemetry unit.  The patient received 5 mg of IV Lopressor while in the emergency department and his most recent heart rate is ranging in the 90s to low 100s.  As patient's potassium level is slightly low in the setting of atrial fibrillation at 3.7, I have ordered for 40 mEq of KCl.  I have requested a magnesium level and will supplement if found to be deficient.  Continue to trend his troponin T level in addition to his EKG.  Obtain an echocardiogram in a.m. consult cardiology for further evaluation and recommendations.  Plan to begin the patient on low-dose metoprolol tartrate in a.m. with holding parameters.  Obtain a TSH.  Will start the patient on full dose Lovenox and consult pharmacy for pricing of oral anticoagulants.    We'll start the patient on prednisone and doxycycline for treatment of an acute COPD exacerbation.  We'll hold on any albuterol at this time.  I'm currently awaiting the patient's home medication list but will resume his other COPD maintenance medications as tolerated/available.      DVT prophylaxis:  Full dose Lovenox    CODE STATUS:    Code Status (Patient has no pulse  and is not breathing): CPR (Attempt to Resuscitate)  Medical Interventions (Patient has pulse or is breathing): Full Support    Admission Status:  I believe this patient meets inpatient status.  Disposition: Home when medically stable.    Electronically signed by Sary Francis DO, 11/16/21, 8:39 PM EST.

## 2021-11-17 NOTE — CONSULTS
Inpatient Cardiology Consult  Consult performed by: Ame Glover APRN  Consult ordered by: Sary Francis DO        Date of Admit: 11/16/2021  Date of Consult: 11/17/21  Provider, No Known  Bhavna Astorga  1950    Consulting Physician: Adan Matthews MD    Cardiology consultation    Reason for consultation: New onset atrial fibrillation with RVR    Assessment:  1. New onset atrial fibrillation with RVR, [id number] is at least 4 for hypertension, diabetes, CAD, and age  2. Chest pain, typical and atypical features.  Troponin T has been negative x3.  No acute ischemic changes noted on EKG.  3. Coronary artery disease, status post stenting x4 in the past  4. Hypomagnesium, patient on replacement protocol.      Recommendations:  1. We will increase metoprolol today and continue monitoring.  Patient has had episodes of controlled rate, however currently his heart rate is greater than 100.  2. Currently patient is anticoagulated with therapeutic dose of enoxaparin.  We will check with pharmacy on the cost of Eliquis for this patient.  He does have concerns about medication costs.  3. Replace magnesium  4. Due to patient's report of chest pain in the setting of new onset of atrial fibrillation, we recommend ischemic evaluation.  Since patient's troponins have been negative, this can be completed as an outpatient.  5. Patient is on rosuvastatin and metoprolol for CAD, I have added aspirin today.      History of Present Illness    Subjective     Chief Complaint   Patient presents with   • Chest Pain       Bhavna Astorga is a 71 y.o. male with past medical history significant for CAD, status post cardiac stenting x4, details unknown, hypertension, dyslipidemia, coal workers pneumoconiosis, COPD, diabetes, neuropathy, vitamin D deficiency, arthritis, and former tobacco use. Patient presented to the ED on 11/16/2021 with complaint of shortness of breath and chest pain. He was noted to be in atrial  fibrillation with RVR in the ED, rate was mostly in the 120s to 130s. Patient was admitted for further evaluation management. Cardiology was consulted for atrial fibrillation with RVR.    Patient was seen and examined.  He states that for the last several weeks he is had sensation of fast heart rate with associated shortness of breath.  He reports a left sided chest pain which he describes as a twinge on his left side.  He states this does not feel like his heart attacks in the past and felt.  He further states that his breathing has improved since his admission to the hospital.    Cardiac risk factors:arteriosclerotic heart disease, diabetes mellitus, hypercholesterolemia, hypertension and age and gender    Last Echo: Completed, not yet read    Last Stress: N/A    Last Cath: N/A      Past Medical History:   Diagnosis Date   • Arthritis    • BPH (benign prostatic hyperplasia)    • CAD (coronary artery disease)    • Coal workers pneumoconiosis (HCC)    • COPD (chronic obstructive pulmonary disease) ([facility])    • Diabetes mellitus ([facility])    • GERD (gastroesophageal reflux disease)    • Neuropathy    • Vitamin D deficiency      Past Surgical History:   Procedure Laterality Date   • CORONARY STENT PLACEMENT       Family History   Family history unknown: Yes     Social History     Tobacco Use   • Smoking status: Former Smoker     Packs/day: 1.50     Types: Cigarettes     Start date: 1968     Quit date: 2004     Years since quittin.8   • Smokeless tobacco: Never Used   Vaping Use   • Vaping Use: Never used   Substance Use Topics   • Alcohol use: No   • Drug use: No     Medications Prior to Admission   Medication Sig Dispense Refill Last Dose   • albuterol sulfate  (90 Base) MCG/ACT inhaler Inhale 2 puffs Every 4 (Four) Hours As Needed for Wheezing or Shortness of Air. 18 g 8 Past Week at Unknown time   • dapagliflozin (FARXIGA) 5 MG tablet tablet Take 5 mg by mouth Daily.   2021 at 0900   •  "gabapentin (NEURONTIN) 300 MG capsule Take 300 mg by mouth 2 (Two) Times a Day.   11/16/2021 at 0900   • ipratropium-albuterol (DUO-NEB) 0.5-2.5 mg/3 ml nebulizer Take 3 mL by nebulization 4 (Four) Times a Day As Needed for Wheezing. 120 mL 5 11/15/2021 at Unknown time   • losartan (COZAAR) 25 MG tablet Take 25 mg by mouth Daily.   11/16/2021 at 0900   • pantoprazole (PROTONIX) 20 MG EC tablet Take 20 mg by mouth Daily.   11/16/2021 at 0900   • rosuvastatin (CRESTOR) 40 MG tablet Take 40 mg by mouth Daily.   11/16/2021 at 0900   • SITagliptin-MetFORMIN HCl ER (JANUMET XR) 100-1000 MG tablet Take 1 tablet by mouth Daily.   11/16/2021 at 0900   • tamsulosin (FLOMAX) 0.4 MG capsule 24 hr capsule Take 1 capsule by mouth Daily.   11/16/2021 at 0900   • traZODone (DESYREL) 50 MG tablet Take 50 mg by mouth Every Night.   11/15/2021 at Unknown time   • vitamin D (ERGOCALCIFEROL) 1.25 MG (22234 UT) capsule capsule Take 50,000 Units by mouth Every 7 (Seven) Days. PTA: takes on mondays   11/15/2021 at Unknown time   • Budeson-Glycopyrrol-Formoterol (Breztri Aerosphere) 160-9-4.8 MCG/ACT aerosol inhaler Inhale 2 puffs 2 (Two) Times a Day. 1 each 8 Unknown at Unknown time     Allergies:  Patient has no known allergies.    Review of Systems   Constitutional: Negative for fatigue.   Respiratory: Positive for shortness of breath.    Cardiovascular: Positive for chest pain (\"twinge on the left side\") and palpitations. Negative for leg swelling.   Gastrointestinal: Negative for blood in stool.   Neurological: Negative for dizziness, syncope, weakness and light-headedness.   Hematological: Does not bruise/bleed easily.   All other systems reviewed and are negative.        Objective      Vital Signs  Temp:  [96.8 °F (36 °C)-99.6 °F (37.6 °C)] 98.4 °F (36.9 °C)  Heart Rate:  [] 78  Resp:  [18-20] 20  BP: (109-181)/() 154/86  Body mass index is 27.08 kg/m².    Intake/Output Summary (Last 24 hours) at 11/17/2021 0851  Last " data filed at 11/17/2021 0619  Gross per 24 hour   Intake 360 ml   Output 1350 ml   Net -990 ml       Physical Exam  Vitals reviewed.   Constitutional:       Appearance: He is well-developed.   HENT:      Head: Normocephalic and atraumatic.   Eyes:      Pupils: Pupils are equal, round, and reactive to light.   Neck:      Vascular: No JVD.   Cardiovascular:      Rate and Rhythm: Normal rate and regular rhythm.      Heart sounds: No murmur heard.  No friction rub. No gallop.    Pulmonary:      Effort: Pulmonary effort is normal. No respiratory distress.      Breath sounds: Decreased air movement present. No wheezing or rales.   Abdominal:      Palpations: Abdomen is soft. There is no mass.      Tenderness: There is no abdominal tenderness.      Hernia: No hernia is present.   Skin:     General: Skin is warm and dry.         Telemetry:  Sinus 70s    Results Review:   I reviewed the patient's new clinical results.  Results from last 7 days   Lab Units 11/16/21 2139 11/16/21 1959 11/16/21  1727   TROPONIN T ng/mL <0.010 <0.010 <0.010     Results from last 7 days   Lab Units 11/17/21  0454 11/16/21  1727   WBC 10*3/mm3 6.27 5.94   HEMOGLOBIN g/dL 13.2 13.6   PLATELETS 10*3/mm3 307 303     Results from last 7 days   Lab Units 11/17/21  0455 11/16/21  1727   SODIUM mmol/L 139 140   POTASSIUM mmol/L 3.8 3.7   CHLORIDE mmol/L 102 101   CO2 mmol/L 20.8* 22.9   BUN mg/dL 15 15   CREATININE mg/dL 0.74* 0.75*   CALCIUM mg/dL 9.9 9.8   GLUCOSE mg/dL 207* 137*   ALT (SGPT) U/L 17 20   AST (SGOT) U/L 25 28     Lab Results   Component Value Date    INR 0.97 11/16/2021     Lab Results   Component Value Date    MG 1.7 11/16/2021    MG 1.7 11/16/2021     Lab Results   Component Value Date    TSH 1.950 11/16/2021    CHLPL 164 11/17/2015    TRIG 221 (H) 11/17/2015    HDL 32 (L) 11/17/2015    LDL 88 11/17/2015      No results found for: BNP     EKG:         Imaging Results (Last 72 Hours)     Procedure Component Value Units Date/Time     XR Chest 2 View [[id number]] Collected: 11/16/21 1728     Updated: 11/16/21 1733    Narrative:      EXAM:    XR Chest, 2 Views     EXAM DATE:    11/16/2021 5:09 PM     CLINICAL HISTORY:    new onset afib     TECHNIQUE:    Frontal and lateral views of the chest.     COMPARISON:    01/16/2020     FINDINGS:    Lungs:  Masslike opacities in the perihilar and right upper lobe  region are stable from previous x-ray.  Interstitial thickening is noted  compatible with superimposed edema on fibrosis.    Pleural space:  Unremarkable.  No pneumothorax.    Heart:  Mild cardiomegaly.    Mediastinum:  Unremarkable.    Bones/joints:  Unremarkable.       Impression:      1.  Stable bilateral perihilar and upper lobe masses compatible with  progressive massive fibrosis and known right upper lobe mass.  2.  Development of interstitial thickening likely edema.  3.  Otherwise stable chest     This report was finalized on 11/16/2021 5:31 PM by Dr. Abdi Lopez MD.                Thank you very much for asking us to be involved in this patient's care.  We will follow along with you.    Ame Glover, APRN   11/17/21  08:51 EST

## 2021-11-17 NOTE — PROGRESS NOTES
[facility] HOSPITALIST PROGRESS NOTE     Patient Identification:  Name:  Bhavna Astorga  Age:  71 y.o.  Sex:  male  :  1950  MRN:  [id number]  Visit Number:  [id number]  ROOM: [address] [place] [address]     Primary Care Provider:  Margaret Ellison APRN     Date of Admission: 2021    Length of stay in inpatient status:  1    Subjective     Chief Compliant:    Chief Complaint   Patient presents with   • Chest Pain     History of Presenting Illness: 71-year-old male admitted on 2021 with shortness of air and chest pain; he had been seen in the pulmonology clinic on the day of admission as he thought he was having a lung issue.  The patient has a history of COPD, coal workers pneumoconiosis, and extensive fibrosis.  However, MAGDI Walls did an EKG and saw that the patient was in new onset A. fib with RVR and thus patient was directed to come to the emergency department.  He was given 5 mg IV Lopressor while in the emergency department, which brought his heart rate from the 140s down to the  range.  The patient was then admitted to the telemetry floor.  Today, the patient states that he is feeling better.  He still has some left-sided chest pressure below his left breast; this chest pressure does not radiate anywhere and is not associated with nausea or sweating.  He denies any emesis, diarrhea, abdominal pain, shortness of air, coughing.  He is asking when he can go home.    Objective     Current Hospital Meds:aspirin, 81 mg, Oral, Daily  budesonide-formoterol, 2 puff, Inhalation, BID - RT  [START ON 2021] cholecalciferol, 50,000 Units, Oral, Weekly  doxycycline, 100 mg, Oral, Q12H  enoxaparin, 1 mg/kg, Subcutaneous, Q12H  gabapentin, 300 mg, Oral, BID  losartan, 25 mg, Oral, Daily  metoprolol tartrate, 50 mg, Oral, Q8H  pantoprazole, 40 mg, Oral, Daily  predniSONE, 40 mg, Oral, Daily With Breakfast  rosuvastatin, 40 mg, Oral, Daily  tamsulosin, 0.4 mg, Oral, Daily  traZODone, 50 mg, Oral,  Nightly    Pharmacy Consult,       Current Antimicrobial Therapy:  Anti-Infectives (From admission, onward)    Ordered     Dose/Rate Route Frequency Start Stop    11/16/21 2308  doxycycline (MONODOX) capsule 100 mg        Ordering Provider: Sary Francis, DO    100 mg Oral Every 12 Hours Scheduled 11/17/21 0030 11/23/21 2059        Current Diuretic Therapy:  Diuretics (From admission, onward)    None        ----------------------------------------------------------------------------------------------------------------------  Vital Signs:  Temp:  [97 °F (36.1 °C)-99.6 °F (37.6 °C)] 98.4 °F (36.9 °C)  Heart Rate:  [] 94  Resp:  [18-20] 20  BP: (109-181)/() 162/100  SpO2:  [92 %-99 %] 97 %  on  Flow (L/min):  [2] 2;   Device (Oxygen Therapy): nasal cannula  Body mass index is 27.08 kg/m².    Wt Readings from Last 3 Encounters:   11/17/21 90.6 kg (199 lb 11.2 oz)   11/16/21 93.4 kg (206 lb)   06/10/21 98 kg (216 lb)     Intake & Output (last 3 days)       11/14 0701  11/15 0700 11/15 0701 11/16 0700 11/16 0701  11/17 0700 11/17 0701  11/18 0700    P.O.   360 240    Total Intake(mL/kg)   360 (4) 240 (2.6)    Urine (mL/kg/hr)   1350 175 (0.2)    Total Output   1350 175    Net   -990 +65                Diet Regular; Cardiac, Consistent Carbohydrate  ----------------------------------------------------------------------------------------------------------------------  Physical Exam  Constitutional:       Appearance: Normal appearance. He is well-developed. He is not ill-appearing, toxic-appearing or diaphoretic.   HENT:      Head: Normocephalic and atraumatic.      Right Ear: External ear normal.      Left Ear: External ear normal.   Eyes:      General: No scleral icterus.        Right eye: No discharge.         Left eye: No discharge.      Extraocular Movements: Extraocular movements intact.      Conjunctiva/sclera: Conjunctivae normal.      Pupils: Pupils are equal, round, and reactive to light.    Cardiovascular:      Rate and Rhythm: Normal rate. Rhythm irregularly irregular.      Pulses: Normal pulses.      Heart sounds: No murmur heard.      Pulmonary:      Effort: Pulmonary effort is normal. No respiratory distress.      Breath sounds: Normal breath sounds. No wheezing or rales.   Abdominal:      General: Abdomen is flat. There is no distension.      Palpations: Abdomen is soft.   Musculoskeletal:         General: No swelling, deformity or signs of injury.   Skin:     Capillary Refill: Capillary refill takes less than 2 seconds.      Coloration: Skin is not jaundiced or pale.   Neurological:      Mental Status: He is alert and oriented to person, place, and time. Mental status is at baseline.      Cranial Nerves: No cranial nerve deficit.   Psychiatric:         Mood and Affect: Mood normal.         Behavior: Behavior normal. Behavior is cooperative.         Thought Content: Thought content normal.         Judgment: Judgment normal.       ----------------------------------------------------------------------------------------------------------------------  Tele:  Afib with heart rates in the 100's today, currently in the 70-80's.  I have personally reviewed/looked at the telemetry strips.      ECHO is pending.  ----------------------------------------------------------------------------------------------------------------------  LABS:    CBC and coagulation:  Results from last 7 days   Lab Units 11/17/21  8117 11/16/21  1727   WBC 10*3/mm3 6.27 5.94   HEMOGLOBIN g/dL 13.2 13.6   HEMATOCRIT % 43.7 44.8   MCV fL 87.4 85.8   MCHC g/dL 30.2* 30.4*   PLATELETS 10*3/mm3 307 303   INR   --  0.97     Renal and electrolytes:  Results from last 7 days   Lab Units 11/17/21  0455 11/16/21  2139 11/16/21  1727   SODIUM mmol/L 139  --  140   POTASSIUM mmol/L 3.8  --  3.7   MAGNESIUM mg/dL  --  1.7 1.7   CHLORIDE mmol/L 102  --  101   CO2 mmol/L 20.8*  --  22.9   BUN mg/dL 15  --  15   CREATININE mg/dL 0.74*  --  0.75*    EGFR IF NONAFRICN AM mL/min/1.73 104  --  102   CALCIUM mg/dL 9.9  --  9.8   GLUCOSE mg/dL 207*  --  137*     Estimated Creatinine Clearance: 105.4 mL/min (A) (by C-G formula based on SCr of 0.74 mg/dL (L)).    Liver and pancreatic function:  Results from last 7 days   Lab Units 11/17/21  0455 11/16/21  1727   ALBUMIN g/dL 4.04 4.34   BILIRUBIN mg/dL 0.4 0.3   ALK PHOS U/L 56 59   AST (SGOT) U/L 25 28   ALT (SGPT) U/L 17 20     Endocrine function:  Lab Results   Component Value Date    HGBA1C 9.2 (H) 11/17/2015     Point of care bedside glucose levels:  Results from last 7 days   Lab Units 11/17/21  1134 11/17/21  0653 11/16/21  2122   GLUCOSE mg/dL 173* 117 132*     Glucose levels from the CMP:  Results from last 7 days   Lab Units 11/17/21  0455 11/16/21  1727   GLUCOSE mg/dL 207* 137*     Lab Results   Component Value Date    TSH 1.950 11/16/2021     Cardiac:  Results from last 7 days   Lab Units 11/16/21  2139 11/16/21 1959 11/16/21  1727   TROPONIN T ng/mL <0.010 <0.010 <0.010   PROBNP pg/mL  --   --  1,436.0*       Cultures:  Microbiology Results (last 10 days)     Procedure Component Value - Date/Time    COVID PRE-OP / PRE-PROCEDURE SCREENING ORDER (NO ISOLATION) - Swab, Nasopharynx [[id number]]  (Normal) Collected: 11/16/21 1727    Lab Status: Final result Specimen: Swab from Nasopharynx Updated: 11/16/21 1824    Narrative:      The following orders were created for panel order COVID PRE-OP / PRE-PROCEDURE SCREENING ORDER (NO ISOLATION) - Swab, Nasopharynx.  Procedure                               Abnormality         Status                     ---------                               -----------         ------                     COVID-19 and FLU A/B PCR...[[id number]]  Normal              Final result                 Please view results for these tests on the individual orders.    COVID-19 and FLU A/B PCR - Swab, Nasopharynx [[id number]]  (Normal) Collected: 11/16/21 8473    Lab Status: Final result  Specimen: Swab from Nasopharynx Updated: 11/16/21 1824     COVID19 Not Detected     Influenza A PCR Not Detected     Influenza B PCR Not Detected    Narrative:      Fact sheet for providers: https://www.fda.gov/media/690051/download    Fact sheet for patients: https://www.fda.gov/media/904203/download    Test performed by PCR.          I have personally looked at the labs and they are summarized above.  ----------------------------------------------------------------------------------------------------------------------  Detailed radiology reports for the last 24 hours:    Imaging Results (Last 24 Hours)     Procedure Component Value Units Date/Time    XR Chest 2 View [[id number]] Collected: 11/16/21 1728     Updated: 11/16/21 1733    Narrative:      EXAM:    XR Chest, 2 Views     EXAM DATE:    11/16/2021 5:09 PM     CLINICAL HISTORY:    new onset afib     TECHNIQUE:    Frontal and lateral views of the chest.     COMPARISON:    01/16/2020     FINDINGS:    Lungs:  Masslike opacities in the perihilar and right upper lobe  region are stable from previous x-ray.  Interstitial thickening is noted  compatible with superimposed edema on fibrosis.    Pleural space:  Unremarkable.  No pneumothorax.    Heart:  Mild cardiomegaly.    Mediastinum:  Unremarkable.    Bones/joints:  Unremarkable.       Impression:      1.  Stable bilateral perihilar and upper lobe masses compatible with  progressive massive fibrosis and known right upper lobe mass.  2.  Development of interstitial thickening likely edema.  3.  Otherwise stable chest     This report was finalized on 11/16/2021 5:31 PM by Dr. Abdi Lopez MD.           I have personally looked at the radiology images and I have read the available final report.    Assessment & Plan      -New onset A. Fib, QBO4VY2-CBRd score of 4  -Suspect diastolic CHF exacerbation due to the A. fib with RVR  -Mild acute hypomagnesemia  -History of coronary artery disease status post remote  stenting  -History of essential hypertension  -History of type 2 diabetes mellitus  -COPD and coal workers pneumoconiosis with massive fibrosis, with a mild acute exacerbation at this time  -History of tobacco abuse  -History of GERD    Cardiology was consulted and they started the patient on metoprolol; they recommend full anticoagulation as the patient's [id number] score is at least 4.  We will continue to monitor the patient on telemetry to see what his heart rhythm does overnight.  Echocardiogram was ordered and performed but we await the final read from cardiology.  I will give him a one-time dose of Lasix due to the suspicion that he may have some diastolic heart failure from the A. fib with RVR.  His creatinine is at baseline we will monitor this after we give him the Lasix tonight.  We will continue monitoring his blood pressures closely; they are at goal for his age at this time.  For the most part, his glucose levels are controlled; since patient has a history of type 2 diabetes mellitus, I have ordered a hemoglobin A1c.  We will continue monitoring the glucose levels closely.  If the glucose levels start elevating consistently above 200, then we may need to give some as needed sliding scale NovoLog.    VTE Prophylaxis:   Mechanical Order History:     None      Pharmalogical Order History:      Ordered     Dose Route Frequency Stop    11/16/21 2107  enoxaparin (LOVENOX) syringe 90 mg         1 mg/kg SC Every 12 Hours --    11/16/21 2106  Pharmacy to Dose enoxaparin (LOVENOX)  Status:  Discontinued        Question:  Indication of use  Answer:  Atrial Fibrillation - requiring full anticoagulation    -- XX Continuous PRN 11/16/21 2108              Disposition: ***    Marisela Pacheco MD  [facility]  11/17/21  14:55 EST

## 2021-11-17 NOTE — PLAN OF CARE
Goal Outcome Evaluation:               Patient has been resting this shift. Patient has not had any complaints. Will continue with plan of care.

## 2021-11-17 NOTE — PHARMACY PATIENT ASSISTANCE
Pharmacy checked on pricing for Bhavna Astorga. He has a $480 deductible which the family states they can't afford right now.  His wife said they would use warfarin.  Informed Ame Glover to review.    Thank You;  Mikayla Anand, PharmD  11/17/21  12:49 EST

## 2021-11-17 NOTE — PLAN OF CARE
"Goal Outcome Evaluation:  Plan of Care Reviewed With: patient        Progress: no change  Outcome Summary: Patient new admit from ER this shift. Has remained rate controlled afib. Placed on 2L NC because patient was complaining of not being able to \"breathe good.\" O2 was staying in low 90s. Agnes FAIRBANKS made aware. VS stable. No distress noted. Placed on NPO per orders. Will continue to monitor and follow plan of care.  "

## 2021-11-17 NOTE — PHARMACY PATIENT ASSISTANCE
Ame Corado said that Dr. Thurston refuses to start coumadin on Bhavna Astorga. I explained the assistance program and what they have to meet to be eligible and she said to make sure and profile a free trial card on which ever anticoagulant they start and then the office will provide samples. We will check his profile and see if one is started the next few days and send in a free trial card.     Thank You  Mikayla Anand, PharmD  11/17/21  15:47 EST

## 2021-11-17 NOTE — PROGRESS NOTES
[facility] HOSPITALIST PROGRESS NOTE     Patient Identification:  Name:  Bhavna Astorga  Age:  71 y.o.  Sex:  male  :  1950  MRN:  [id number]  Visit Number:  [id number]  ROOM: [address] [place] [address]     Primary Care Provider:  Margaret Ellison APRN     Date of Admission: 2021    Length of stay in inpatient status:  1    Subjective     Chief Compliant:    Chief Complaint   Patient presents with   • Chest Pain     History of Presenting Illness: 71-year-old male admitted on 2021 with shortness of air and chest pain; he had been seen in the pulmonology clinic on the day of admission as he thought he was having a lung issue.  The patient has a history of COPD, coal workers pneumoconiosis, and extensive fibrosis.  However, MAGDI Walls did an EKG and saw that the patient was in new onset A. fib with RVR and thus patient was directed to come to the emergency department.  He was given 5 mg IV Lopressor while in the emergency department, which brought his heart rate from the 140s down to the  range.  The patient was then admitted to the telemetry floor.  Today, the patient states that he is feeling better.  He still has some left-sided chest pressure below his left breast; this chest pressure does not radiate anywhere and is not associated with nausea or sweating.  He denies any emesis, diarrhea, abdominal pain, shortness of air, coughing.  He is asking when he can go home.    Objective     Current Hospital Meds:aspirin, 81 mg, Oral, Daily  budesonide-formoterol, 2 puff, Inhalation, BID - RT  [START ON 2021] cholecalciferol, 50,000 Units, Oral, Weekly  doxycycline, 100 mg, Oral, Q12H  enoxaparin, 1 mg/kg, Subcutaneous, Q12H  gabapentin, 300 mg, Oral, BID  losartan, 25 mg, Oral, Daily  metoprolol tartrate, 50 mg, Oral, Q8H  pantoprazole, 40 mg, Oral, Daily  predniSONE, 40 mg, Oral, Daily With Breakfast  rosuvastatin, 40 mg, Oral, Daily  tamsulosin, 0.4 mg, Oral, Daily  traZODone, 50 mg, Oral,  Nightly    Pharmacy Consult,       Current Antimicrobial Therapy:  Anti-Infectives (From admission, onward)    Ordered     Dose/Rate Route Frequency Start Stop    11/16/21 2308  doxycycline (MONODOX) capsule 100 mg        Ordering Provider: Sary Francis, DO    100 mg Oral Every 12 Hours Scheduled 11/17/21 0030 11/23/21 2059        Current Diuretic Therapy:  Diuretics (From admission, onward)    None        ----------------------------------------------------------------------------------------------------------------------  Vital Signs:  Temp:  [97 °F (36.1 °C)-99.6 °F (37.6 °C)] 98.4 °F (36.9 °C)  Heart Rate:  [] 94  Resp:  [18-20] 20  BP: (109-181)/() 162/100  SpO2:  [92 %-99 %] 97 %  on  Flow (L/min):  [2] 2;   Device (Oxygen Therapy): nasal cannula  Body mass index is 27.08 kg/m².    Wt Readings from Last 3 Encounters:   11/17/21 90.6 kg (199 lb 11.2 oz)   11/16/21 93.4 kg (206 lb)   06/10/21 98 kg (216 lb)     Intake & Output (last 3 days)       11/14 0701  11/15 0700 11/15 0701 11/16 0700 11/16 0701  11/17 0700 11/17 0701  11/18 0700    P.O.   360 240    Total Intake(mL/kg)   360 (4) 240 (2.6)    Urine (mL/kg/hr)   1350 175 (0.2)    Total Output   1350 175    Net   -990 +65                Diet Regular; Cardiac, Consistent Carbohydrate  ----------------------------------------------------------------------------------------------------------------------  Physical Exam  Constitutional:       Appearance: Normal appearance. He is well-developed. He is not ill-appearing, toxic-appearing or diaphoretic.   HENT:      Head: Normocephalic and atraumatic.      Right Ear: External ear normal.      Left Ear: External ear normal.   Eyes:      General: No scleral icterus.        Right eye: No discharge.         Left eye: No discharge.      Extraocular Movements: Extraocular movements intact.      Conjunctiva/sclera: Conjunctivae normal.      Pupils: Pupils are equal, round, and reactive to light.    Cardiovascular:      Rate and Rhythm: Normal rate. Rhythm irregularly irregular.      Pulses: Normal pulses.      Heart sounds: No murmur heard.      Pulmonary:      Effort: Pulmonary effort is normal. No respiratory distress.      Breath sounds: Normal breath sounds. No wheezing or rales.   Abdominal:      General: Abdomen is flat. There is no distension.      Palpations: Abdomen is soft.   Musculoskeletal:         General: No swelling, deformity or signs of injury.   Skin:     Capillary Refill: Capillary refill takes less than 2 seconds.      Coloration: Skin is not jaundiced or pale.   Neurological:      Mental Status: He is alert and oriented to person, place, and time. Mental status is at baseline.      Cranial Nerves: No cranial nerve deficit.   Psychiatric:         Mood and Affect: Mood normal.         Behavior: Behavior normal. Behavior is cooperative.         Thought Content: Thought content normal.         Judgment: Judgment normal.       ----------------------------------------------------------------------------------------------------------------------  Tele:  Afib with heart rates in the 100's today, currently in the 70-80's.  I have personally reviewed/looked at the telemetry strips.      ECHO is pending.  ----------------------------------------------------------------------------------------------------------------------  LABS:    CBC and coagulation:  Results from last 7 days   Lab Units 11/17/21  9921 11/16/21  1727   WBC 10*3/mm3 6.27 5.94   HEMOGLOBIN g/dL 13.2 13.6   HEMATOCRIT % 43.7 44.8   MCV fL 87.4 85.8   MCHC g/dL 30.2* 30.4*   PLATELETS 10*3/mm3 307 303   INR   --  0.97     Renal and electrolytes:  Results from last 7 days   Lab Units 11/17/21  0455 11/16/21  2139 11/16/21  1727   SODIUM mmol/L 139  --  140   POTASSIUM mmol/L 3.8  --  3.7   MAGNESIUM mg/dL  --  1.7 1.7   CHLORIDE mmol/L 102  --  101   CO2 mmol/L 20.8*  --  22.9   BUN mg/dL 15  --  15   CREATININE mg/dL 0.74*  --  0.75*    EGFR IF NONAFRICN AM mL/min/1.73 104  --  102   CALCIUM mg/dL 9.9  --  9.8   GLUCOSE mg/dL 207*  --  137*     Estimated Creatinine Clearance: 105.4 mL/min (A) (by C-G formula based on SCr of 0.74 mg/dL (L)).    Liver and pancreatic function:  Results from last 7 days   Lab Units 11/17/21  0455 11/16/21  1727   ALBUMIN g/dL 4.04 4.34   BILIRUBIN mg/dL 0.4 0.3   ALK PHOS U/L 56 59   AST (SGOT) U/L 25 28   ALT (SGPT) U/L 17 20     Endocrine function:  Lab Results   Component Value Date    HGBA1C 9.2 (H) 11/17/2015     Point of care bedside glucose levels:  Results from last 7 days   Lab Units 11/17/21  1134 11/17/21  0653 11/16/21  2122   GLUCOSE mg/dL 173* 117 132*     Glucose levels from the CMP:  Results from last 7 days   Lab Units 11/17/21  0455 11/16/21  1727   GLUCOSE mg/dL 207* 137*     Lab Results   Component Value Date    TSH 1.950 11/16/2021     Cardiac:  Results from last 7 days   Lab Units 11/16/21  2139 11/16/21 1959 11/16/21  1727   TROPONIN T ng/mL <0.010 <0.010 <0.010   PROBNP pg/mL  --   --  1,436.0*       Cultures:  Microbiology Results (last 10 days)     Procedure Component Value - Date/Time    COVID PRE-OP / PRE-PROCEDURE SCREENING ORDER (NO ISOLATION) - Swab, Nasopharynx [[id number]]  (Normal) Collected: 11/16/21 1727    Lab Status: Final result Specimen: Swab from Nasopharynx Updated: 11/16/21 1824    Narrative:      The following orders were created for panel order COVID PRE-OP / PRE-PROCEDURE SCREENING ORDER (NO ISOLATION) - Swab, Nasopharynx.  Procedure                               Abnormality         Status                     ---------                               -----------         ------                     COVID-19 and FLU A/B PCR...[[id number]]  Normal              Final result                 Please view results for these tests on the individual orders.    COVID-19 and FLU A/B PCR - Swab, Nasopharynx [[id number]]  (Normal) Collected: 11/16/21 0697    Lab Status: Final result  Specimen: Swab from Nasopharynx Updated: 11/16/21 1824     COVID19 Not Detected     Influenza A PCR Not Detected     Influenza B PCR Not Detected    Narrative:      Fact sheet for providers: https://www.fda.gov/media/566246/download    Fact sheet for patients: https://www.fda.gov/media/481325/download    Test performed by PCR.          I have personally looked at the labs and they are summarized above.  ----------------------------------------------------------------------------------------------------------------------  Detailed radiology reports for the last 24 hours:    Imaging Results (Last 24 Hours)     Procedure Component Value Units Date/Time    XR Chest 2 View [[id number]] Collected: 11/16/21 1728     Updated: 11/16/21 1733    Narrative:      EXAM:    XR Chest, 2 Views     EXAM DATE:    11/16/2021 5:09 PM     CLINICAL HISTORY:    new onset afib     TECHNIQUE:    Frontal and lateral views of the chest.     COMPARISON:    01/16/2020     FINDINGS:    Lungs:  Masslike opacities in the perihilar and right upper lobe  region are stable from previous x-ray.  Interstitial thickening is noted  compatible with superimposed edema on fibrosis.    Pleural space:  Unremarkable.  No pneumothorax.    Heart:  Mild cardiomegaly.    Mediastinum:  Unremarkable.    Bones/joints:  Unremarkable.       Impression:      1.  Stable bilateral perihilar and upper lobe masses compatible with  progressive massive fibrosis and known right upper lobe mass.  2.  Development of interstitial thickening likely edema.  3.  Otherwise stable chest     This report was finalized on 11/16/2021 5:31 PM by Dr. Abdi Lopez MD.           I have personally looked at the radiology images and I have read the available final report.    Assessment & Plan      -New onset A. Fib, WIO2JP6-JCTn score of 4  -Suspect diastolic CHF exacerbation due to the A. 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  We will continue to monitor the patient on telemetry to see what his heart rhythm does overnight.  Echocardiogram was ordered and performed but we await the final read from cardiology.  I will give him a one-time dose of Lasix due to the suspicion that he may have some diastolic heart failure from the A. fib with RVR.  His creatinine is at baseline we will monitor this after we give him the Lasix tonight.  We will continue monitoring his blood pressures closely; they are at goal for his age at this time.  For the most part, his glucose levels are controlled; since patient has a history of type 2 diabetes mellitus, I have ordered a hemoglobin A1c.  We will continue monitoring the glucose levels closely.  If the glucose levels start elevating consistently above 200, then we may need to give some as needed sliding scale NovoLog.  We will continue monitoring the magnesium level closely, especially since I will be given him a dose of Lasix.  We will continue to replace the magnesium per our protocol.    VTE Prophylaxis:   Mechanical Order History:     None      Pharmalogical Order History:      Ordered     Dose Route Frequency Stop    11/16/21 2107  enoxaparin (LOVENOX) syringe 90 mg         1 mg/kg SC Every 12 Hours --    11/16/21 2106  Pharmacy to Dose enoxaparin (LOVENOX)  Status:  Discontinued        Question:  Indication of use  Answer:  Atrial Fibrillation - requiring full anticoagulation    -- XX Continuous PRN 11/16/21 2108              Disposition: Anticipate home with cardiology follow-up    Marisela Pacheco MD  [facility]  [facility]  11/17/21  14:55 EST

## 2021-11-17 NOTE — CASE MANAGEMENT/SOCIAL WORK
Discharge Planning Assessment   [place]     Patient Name: Bhavna Astorga  MRN: [id number]  Today's Date: 11/17/2021    Admit Date: 11/16/2021     Discharge Needs Assessment     Row Name 11/17/21 0902       Living Environment    Lives With spouse    Name(s) of Who Lives With Patient Gay    Primary Care Provided by self    Family Caregiver if Needed child(dre), adult; spouse    Family Caregiver Names spouse- Gay & son Scott    Quality of Family Relationships supportive; helpful; involved    Able to Return to Prior Arrangements yes       Resource/Environmental Concerns    Transportation Concerns car, none       Transition Planning    Patient/Family Anticipates Transition to home with family    Transportation Anticipated car, drives self       Discharge Needs Assessment    Readmission Within the Last 30 Days no previous admission in last 30 days    Equipment Currently Used at Home glucometer; cane, straight    Concerns to be Addressed no discharge needs identified; denies needs/concerns at this time    Equipment Needed After Discharge none               Discharge Plan     Row Name 11/17/21 0903       Plan    Plan CM spoke with pt via phone. He is friendly to speak with . Pt lives at home with spouse, Gay, who assists him as needed. Pt  follows with SHANICE Ann-PCP, He has Medicare A&B & denies any issues with med copays, Pt utilizes st. cane & glucometer at home. He does not utilize home health services. Pt indep. drives. He plans to return home with family at dc. Pt reports he drove to the hospital and plans to drive home. He currently denies any anticipated dc needs , CM will follow & assist as needed.    Patient/Family in Agreement with Plan yes    Row Name 11/17/21 9726       Plan    Plan Comments New onset Afib w/RVR, Mild COPD exac., Rec'd lopressor IV in ED, mon.& tx elytes, Trend Trop T & EKG, obtain echo, cst Cardiology. initiate Metop. po w/parameters. Lovenox. Symbicort, Prednisone/doxy po  for tx of COPD exac. Tmax 99.6, sats 97% on 2lnc, pBNP 1436, bun 15/cr. 0.74, wbc 6.2.              Continued Care and Services - Admitted Since 11/16/2021    Coordination has not been started for this encounter.       Expected Discharge Date and Time     Expected Discharge Date Expected Discharge Time    Nov 19, 2021          Demographic Summary     Row Name 11/17/21 0901       General Information    Admission Type inpatient    General Information Comments SHANICE Ann for his PCP               Functional Status     Row Name 11/17/21 0901       Functional Status    Functional Status Comments indep at baseline       Functional Status, IADL    IADL Comments indep. at baseline               Psychosocial    No documentation.                Abuse/Neglect    No documentation.                Legal    No documentation.                Substance Abuse    No documentation.                Patient Forms    No documentation.                   Amy Soliman RN

## 2021-11-18 VITALS
TEMPERATURE: 98.5 F | BODY MASS INDEX: 26.87 KG/M2 | DIASTOLIC BLOOD PRESSURE: 59 MMHG | RESPIRATION RATE: 20 BRPM | HEART RATE: 72 BPM | HEIGHT: 72 IN | OXYGEN SATURATION: 95 % | WEIGHT: 198.4 LBS | SYSTOLIC BLOOD PRESSURE: 126 MMHG

## 2021-11-18 LAB
ANION GAP SERPL CALCULATED.3IONS-SCNC: 12.7 MMOL/L (ref 5–15)
BUN SERPL-MCNC: 20 MG/DL (ref 8–23)
BUN/CREAT SERPL: 32.8 (ref 7–25)
CALCIUM SPEC-SCNC: 9.9 MG/DL (ref 8.6–10.5)
CHLORIDE SERPL-SCNC: 102 MMOL/L (ref 98–107)
CO2 SERPL-SCNC: 21.3 MMOL/L (ref 22–29)
CREAT SERPL-MCNC: 0.61 MG/DL (ref 0.76–1.27)
DEPRECATED RDW RBC AUTO: 48.3 FL (ref 37–54)
ERYTHROCYTE [DISTWIDTH] IN BLOOD BY AUTOMATED COUNT: 15 % (ref 12.3–15.4)
GFR SERPL CREATININE-BSD FRML MDRD: 130 ML/MIN/1.73
GLUCOSE BLDC GLUCOMTR-MCNC: 136 MG/DL (ref 70–130)
GLUCOSE BLDC GLUCOMTR-MCNC: 188 MG/DL (ref 70–130)
GLUCOSE SERPL-MCNC: 113 MG/DL (ref 65–99)
HCT VFR BLD AUTO: 42.6 % (ref 37.5–51)
HGB BLD-MCNC: 12.6 G/DL (ref 13–17.7)
MAGNESIUM SERPL-MCNC: 2 MG/DL (ref 1.6–2.4)
MCH RBC QN AUTO: 26.1 PG (ref 26.6–33)
MCHC RBC AUTO-ENTMCNC: 29.6 G/DL (ref 31.5–35.7)
MCV RBC AUTO: 88.2 FL (ref 79–97)
PHOSPHATE SERPL-MCNC: 3.4 MG/DL (ref 2.5–4.5)
PLATELET # BLD AUTO: 280 10*3/MM3 (ref 140–450)
PMV BLD AUTO: 12.1 FL (ref 6–12)
POTASSIUM SERPL-SCNC: 4.1 MMOL/L (ref 3.5–5.2)
RBC # BLD AUTO: 4.83 10*6/MM3 (ref 4.14–5.8)
SODIUM SERPL-SCNC: 136 MMOL/L (ref 136–145)
WBC NRBC COR # BLD: 5.75 10*3/MM3 (ref 3.4–10.8)

## 2021-11-18 PROCEDURE — 63710000001 PREDNISONE PER 1 MG: Performed by: INTERNAL MEDICINE

## 2021-11-18 PROCEDURE — 25010000002 ENOXAPARIN PER 10 MG: Performed by: INTERNAL MEDICINE

## 2021-11-18 PROCEDURE — 94760 N-INVAS EAR/PLS OXIMETRY 1: CPT

## 2021-11-18 PROCEDURE — 99239 HOSP IP/OBS DSCHRG MGMT >30: CPT | Performed by: INTERNAL MEDICINE

## 2021-11-18 PROCEDURE — 83735 ASSAY OF MAGNESIUM: CPT | Performed by: INTERNAL MEDICINE

## 2021-11-18 PROCEDURE — 80048 BASIC METABOLIC PNL TOTAL CA: CPT | Performed by: INTERNAL MEDICINE

## 2021-11-18 PROCEDURE — 94799 UNLISTED PULMONARY SVC/PX: CPT

## 2021-11-18 PROCEDURE — 82962 GLUCOSE BLOOD TEST: CPT

## 2021-11-18 PROCEDURE — 85027 COMPLETE CBC AUTOMATED: CPT | Performed by: INTERNAL MEDICINE

## 2021-11-18 PROCEDURE — 84100 ASSAY OF PHOSPHORUS: CPT | Performed by: INTERNAL MEDICINE

## 2021-11-18 RX ORDER — DOXYCYCLINE 100 MG/1
100 CAPSULE ORAL EVERY 12 HOURS SCHEDULED
Qty: 10 CAPSULE | Refills: 0 | Status: SHIPPED | OUTPATIENT
Start: 2021-11-18 | End: 2021-11-23

## 2021-11-18 RX ORDER — ASPIRIN 81 MG/1
81 TABLET ORAL DAILY
Qty: 30 TABLET | Refills: 0 | Status: SHIPPED | OUTPATIENT
Start: 2021-11-19

## 2021-11-18 RX ADMIN — PREDNISONE 40 MG: 20 TABLET ORAL at 09:09

## 2021-11-18 RX ADMIN — DOXYCYCLINE 100 MG: 100 CAPSULE ORAL at 09:09

## 2021-11-18 RX ADMIN — ENOXAPARIN SODIUM 90 MG: 100 INJECTION SUBCUTANEOUS at 09:10

## 2021-11-18 RX ADMIN — METOPROLOL TARTRATE 50 MG: 50 TABLET, FILM COATED ORAL at 06:45

## 2021-11-18 RX ADMIN — ROSUVASTATIN CALCIUM 40 MG: 20 TABLET, FILM COATED ORAL at 09:09

## 2021-11-18 RX ADMIN — BUDESONIDE AND FORMOTEROL FUMARATE DIHYDRATE 2 PUFF: 160; 4.5 AEROSOL RESPIRATORY (INHALATION) at 07:20

## 2021-11-18 RX ADMIN — PANTOPRAZOLE SODIUM 40 MG: 40 TABLET, DELAYED RELEASE ORAL at 09:08

## 2021-11-18 RX ADMIN — METOPROLOL TARTRATE 50 MG: 50 TABLET, FILM COATED ORAL at 14:22

## 2021-11-18 RX ADMIN — LOSARTAN POTASSIUM 25 MG: 25 TABLET, FILM COATED ORAL at 09:09

## 2021-11-18 RX ADMIN — ASPIRIN 81 MG: 81 TABLET, COATED ORAL at 09:08

## 2021-11-18 RX ADMIN — TAMSULOSIN HYDROCHLORIDE 0.4 MG: 0.4 CAPSULE ORAL at 09:08

## 2021-11-18 RX ADMIN — IBUPROFEN 600 MG: 600 TABLET, FILM COATED ORAL at 00:36

## 2021-11-18 NOTE — PLAN OF CARE
"Goal Outcome Evaluation:  Patient has rested well throughout the night. Patient did have complaints of foot pain tingling S.Julio César Oconnor aware. Patient states he takes \"Advil\" at home see MAR. Patents does state relief with this mediation. No s/s of distress/ no further complaints. Will continue to monitor and follow care plan.      "

## 2021-11-18 NOTE — CASE MANAGEMENT/SOCIAL WORK
Discharge Planning Assessment  [facility]     Patient Name: Bhavna Astorga  MRN: [id number]  Today's Date: 11/18/2021    Admit Date: 11/16/2021     Discharge Needs Assessment    No documentation.                Discharge Plan     Row Name 11/18/21 1307       Plan    Final Discharge Disposition Code 01 - home or self-care    Final Note Pt is being discharged home on this date. No other needs identified.                Janna Briscoe

## 2021-11-18 NOTE — PLAN OF CARE
Goal Outcome Evaluation:               Patient has had no complaints. Patient shows no s/s of acute distress. Patient being discharged. Patient IV and tele removed. Will continue with plan of care.

## 2021-11-18 NOTE — DISCHARGE SUMMARY
[facility] HOSPITALISTS DISCHARGE SUMMARY    Patient Identification:  Name:  Bhavna Astorga  Age:  71 y.o.  Sex:  male  :  1950  MRN:  [id number]  Visit Number:  [id number]    Date of Admission: 2021  Date of Discharge: 2021    PCP: Margaret Ellison APRN    DISCHARGE DIAGNOSES  -New onset A. Fib, AZZ6AV2-UNRb score of 4  -Suspect diastolic CHF exacerbation due to the A. fib with RVR  -Mild acute hypomagnesemia  -Small, less than 1 cm, pericardial effusion seen on echocardiogram  -Mild left ventricular hypertrophy seen on echocardiogram  -History of coronary artery disease status post remote stenting  -History of essential hypertension  -History of type 2 diabetes mellitus  -COPD and coal workers pneumoconiosis with massive fibrosis, with a mild acute exacerbation at this time  -History of tobacco abuse  -History of GERD    CONSULTS   Dr. Matthews with interventional cardiology    PROCEDURES PERFORMED  None    HOSPITAL COURSE  Patient is a 71 y.o. male presented to [facility] on 2021 with shortness of air and chest pain; he had been seen in the pulmonology clinic on the day of admission as he thought he was having a lung issue.  However, MAGDI Walls did an EKG and saw that the patient was in new onset A. fib with RVR and thus patient was directed to come to the emergency department.  In the emergency department, patient was noted to be in A. fib with heart rates in the 140s.  He was given 5 mg IV Lopressor and then admitted to the telemetry floor..  Please see the admitting history and physical for further details.  The 5 mg IV Lopressor made his heart rates go down to the  range.  Cardiology was consulted.  The patient's CAB1PD7-WPOb score is at least 4 and the patient will need oral anticoagulation.  The deductible for an oral anticoagulant is $480; however, we have a card that we will give the patient 30 days of free Eliquis.  Afterwards, the family can  discuss with cardiology clinic about starting on Coumadin.  The patient was also started on metoprolol.  On the day of discharge, the patient was requesting to go home.  I called his wife and she talked to the patient; she said that she will help the patient with his cardiac follow-up.  The patient is to take 25 mg twice a day of metoprolol and the Eliquis.  He will follow up with cardiology in 2 to 3 weeks.  Please note the patient had no obvious bleeding while hospitalized.    Also, the patient was noted to have wheezing on his initial exam.  He was started on doxycycline and steroids; the patient had quick resolution of the wheezing.  Therefore, the patient will finish out doxycycline at home without any steroids.  The patient will take Breztri at home for COPD maintenance treatment.    On day of discharge, the patient was requesting to go home.  He was able to perform all of his activities of daily living.  He was able to ambulate without any chest pain and without any shortness of breath.  He was tolerating his regular diet.      VITAL SIGNS:  Temp:  [97.4 °F (36.3 °C)-98.5 °F (36.9 °C)] 98.5 °F (36.9 °C)  Heart Rate:  [] 83  Resp:  [18-20] 20  BP: (116-156)/() 125/74  SpO2:  [90 %-98 %] 95 %  on  Flow (L/min):  [2] 2;   Device (Oxygen Therapy): room air    Body mass index is 26.91 kg/m².  Wt Readings from Last 3 Encounters:   11/18/21 90 kg (198 lb 6.4 oz)   11/16/21 93.4 kg (206 lb)   06/10/21 98 kg (216 lb)       PHYSICAL EXAM:  Constitutional:       Appearance: Normal appearance. He is well-developed. He is not ill-appearing, toxic-appearing or diaphoretic.   HENT:      Head: Normocephalic and atraumatic.      Right Ear: External ear normal.      Left Ear: External ear normal.   Eyes:      General: No scleral icterus.        Right eye: No discharge.         Left eye: No discharge.      Extraocular Movements: Extraocular movements intact.      Conjunctiva/sclera: Conjunctivae normal.      Pupils:  Pupils are equal, round, and reactive to light.   Cardiovascular:      Rate and Rhythm: Normal rate. Rhythm irregularly irregular.      Pulses: Normal pulses.      Heart sounds: No murmur heard.  Pulmonary:      Effort: Pulmonary effort is normal. No respiratory distress.      Breath sounds: Normal breath sounds. No wheezing or rales.   Abdominal:      General: Abdomen is flat. There is no distension.      Palpations: Abdomen is soft.   Musculoskeletal:         General: No swelling, deformity or signs of injury.   Skin:     Capillary Refill: Capillary refill takes less than 2 seconds.      Coloration: Skin is not jaundiced or pale.   Neurological:      Mental Status: He is alert and oriented to person, place, and time. Mental status is at baseline.      Cranial Nerves: No cranial nerve deficit.   Psychiatric:         Mood and Affect: Mood normal.         Behavior: Behavior normal. Behavior is cooperative.         Thought Content: Thought content normal.         Judgment: Judgment normal.       DISCHARGE DISPOSITION   Stable       Discharge Medications      New Medications      Instructions Start Date   apixaban 5 MG tablet tablet  Commonly known as: ELIQUIS   5 mg, Oral, Every 12 Hours Scheduled      aspirin 81 MG EC tablet   81 mg, Oral, Daily   Start Date: November 19, 2021     Breztri Aerosphere 160-9-4.8 MCG/ACT aerosol inhaler  Generic drug: Budeson-Glycopyrrol-Formoterol   2 puffs, Inhalation, 2 Times Daily      doxycycline 100 MG capsule  Commonly known as: MONODOX   100 mg, Oral, Every 12 Hours Scheduled      metoprolol tartrate 25 MG tablet  Commonly known as: LOPRESSOR   25 mg, Oral, 2 Times Daily         Continue These Medications      Instructions Start Date   albuterol sulfate  (90 Base) MCG/ACT inhaler  Commonly known as: PROVENTIL HFA;VENTOLIN HFA;PROAIR HFA   2 puffs, Inhalation, Every 4 Hours PRN      gabapentin 300 MG capsule  Commonly known as: NEURONTIN   300 mg, Oral, 2 Times Daily       ipratropium-albuterol 0.5-2.5 mg/3 ml nebulizer  Commonly known as: DUO-NEB   3 mL, Nebulization, 4 Times Daily PRN      losartan 25 MG tablet  Commonly known as: COZAAR   25 mg, Oral, Daily      pantoprazole 20 MG EC tablet  Commonly known as: PROTONIX   20 mg, Oral, Daily      rosuvastatin 40 MG tablet  Commonly known as: CRESTOR   40 mg, Oral, Daily      tamsulosin 0.4 MG capsule 24 hr capsule  Commonly known as: FLOMAX   1 capsule, Oral, Daily      traZODone 50 MG tablet  Commonly known as: DESYREL   50 mg, Oral, Nightly      vitamin D 1.25 MG (15851 UT) capsule capsule  Commonly known as: ERGOCALCIFEROL   50,000 Units, Oral, Every 7 Days, PTA: takes on mondays         Stop These Medications    Farxiga 5 MG tablet tablet  Generic drug: dapagliflozin     Janumet -1000 MG tablet  Generic drug: SITagliptin-metFORMIN HCl ER            Diet Instructions     Diet: Regular, Consistent Carbohydrate, Cardiac; Thin      Discharge Diet:  Regular  Consistent Carbohydrate  Cardiac       Fluid Consistency: Thin        Activity Instructions     Activity as Tolerated          Additional Instructions for the Follow-ups that You Need to Schedule     Discharge Follow-up with PCP   As directed     Currently Documented PCP:    Margaret Ellison APRN    PCP Phone Number:    [phone]     Follow Up Details: 2-7 days             Discharge Follow-up with Specified Provider: Dr. Thurston; 2 Weeks   As directed    To: Dr. Thurston    Follow Up: 2 Weeks                   CODE STATUS  Code Status and Medical Interventions:   Ordered at: 11/16/21 2020     Code Status (Patient has no pulse and is not breathing):    CPR (Attempt to Resuscitate)     Medical Interventions (Patient has pulse or is breathing):    Full Support       Marislea Pacheco MD  [facility]  11/18/21  12:59 EST    Please note that this discharge summary required more than 30 minutes to complete.

## 2021-11-18 NOTE — CONSULTS
"Diabetes Education  Assessment/Teaching    Patient Name:  Bhavna Astorga  YOB: 1950  MRN: [id number]  Admit Date:  11/16/2021      Assessment Date:  11/18/2021    Most Recent Value   General Information     Height 182.9 cm (72\")   Height Method Stated   Weight 90 kg (198 lb 6.4 oz)   Weight Method Bed scale   Pregnancy Assessment    Diabetes History    Education Preferences    Nutrition Information    Assessment Topics    DM Goals             Most Recent Value   DM Education Needs    Meter Has own   Frequency of Testing Daily   Medication Oral,  Insulin   Problem Solving Hypoglycemia,  Hyperglycemia,  Sick days,  Signs,  Symptoms,  Treatment   Reducing Risks Cardiovascular,  Immunizations,  Foot care,  Dental exam,  Eye exam,  Blood pressure,  Lipids,  A1C testing,  Neuropathy,  Retinopathy   Healthy Eating Basic meal plan provided   Physical Activity Frequency Regularly   Healthy Coping Appropriate   Discharge Plan Home,  Follow-up with PCP   Motivation Moderate   Teaching Method Explanation,  Discussion,  Handouts   Patient Response Verbalized understanding            Other Comments:  A1C 7.8 Patient did not wear a mask.Patient was seen, educated and received ADA handouts on diet, activity, checking blood glucose, taking medication as prescribed, checking feet daily and S/S of hypo/hyperglycemia. Patient was educated on sick rule days. Patient had no questions or concerns. Please re-consult or call for concerns or questions. Thank you.         Electronically signed by:  Tiff Saavedra RN  11/18/21 07:48 EST  "

## 2021-11-18 NOTE — CONSULTS
Patient is a 71 year old male with a history of COPD, BPH, CCAD, coal workers pneumoconiosis. Diabetes, and neuropathy.   Length of diagnosis unknown  What stage have you been told you are in unknown  Shortness of breath? Yes  Do you have sleep apnea? no  Do you use a C-PAP or Bi-PAP? No  Do you have a cough? Yes  Have you had any recent weight loss? no  Do you use inhalers/ emergency inhaler and how often? Yes. How Often: as prescribed  Do you have a nebulizer machine and how often do you use? Yes. How Often: as needed  Do you smoke? former smoker, quit 18 years ago  How many pillows do you use? 2  Barriers to learning? No  Materials provided? COPD book  Last PFT/when/where? 2019  Do you follow a pulmonologist? yes  Do you get short of breath on normal activity? yes  Home Oxygen room air  Last hospital stay for COPD? unknown  COPD Zone? Green/yellow/red Yellow      Vitals:    11/18/21 0720   BP:    Pulse: 66   Resp: 20   Temp:    SpO2: 98%          11/16/21  2105 11/17/21  0500 11/18/21  0500   Weight: 90.6 kg (199 lb 11.2 oz) 90.6 kg (199 lb 11.2 oz) (admit weight less than 24 hours) 90 kg (198 lb 6.4 oz)    Patient did not wear a mask. Patient received education form the COPD book and states he follows up with a pulmonologist and has no questions. Office contact information was given to patient for questions. If any concerns please re-consult or call. Thank you.

## 2021-11-19 ENCOUNTER — READMISSION MANAGEMENT (OUTPATIENT)
Dept: CALL CENTER | Facility: HOSPITAL | Age: 71
End: 2021-11-19

## 2021-11-19 NOTE — OUTREACH NOTE
Prep Survey      Responses   [facility]? [place]   Is LACE score < 7 ? No   Emergency Room discharge w/ pulse ox? No   Eligibility Readm Mgmt   Discharge diagnosis Primary hypertension   Does the patient have one of the following disease processes/diagnoses(primary or secondary)? Other   Does the patient have Home health ordered? No   Is there a DME ordered? No   Comments regarding appointments Appts needed   Medication alerts for this patient ASA, Breztri, Eliquis and Lopressor   Prep survey completed? Yes          Ana Schmidt RN

## 2021-11-22 ENCOUNTER — READMISSION MANAGEMENT (OUTPATIENT)
Dept: CALL CENTER | Facility: HOSPITAL | Age: 71
End: 2021-11-22

## 2021-11-22 NOTE — OUTREACH NOTE
Medical Week 1 Survey      Responses   [facility] patient discharged from? [place]   Does the patient have one of the following disease processes/diagnoses(primary or secondary)? Other   Week 1 attempt successful? No   Unsuccessful attempts Attempt 1          Kiran De Leon RN

## 2021-11-23 ENCOUNTER — READMISSION MANAGEMENT (OUTPATIENT)
Dept: CALL CENTER | Facility: HOSPITAL | Age: 71
End: 2021-11-23

## 2021-11-30 ENCOUNTER — READMISSION MANAGEMENT (OUTPATIENT)
Dept: CALL CENTER | Facility: HOSPITAL | Age: 71
End: 2021-11-30

## 2021-11-30 NOTE — OUTREACH NOTE
Medical Week 2 Survey      Responses   [facility] patient discharged from? [place]   Does the patient have one of the following disease processes/diagnoses(primary or secondary)? Other   Week 2 attempt successful? Yes   Call start time 1527   Discharge diagnosis Primary hypertension   Call end time 1531   Person spoke with today (if not patient) and relationship Gay  -wife    Meds reviewed with patient/caregiver? Yes   Is the patient taking all medications as directed (includes completed medication regime)? Yes   Has the patient kept scheduled appointments due by today? Yes   Comments Cardiology 12/13/2021   What is the patient's perception of their health status since discharge? Improving   Additional teach back comments Wife says he is doing better and b/p is doing good   Week 2 Call Completed? Yes          Sarahi Nguyen RN

## 2021-12-08 ENCOUNTER — READMISSION MANAGEMENT (OUTPATIENT)
Dept: CALL CENTER | Facility: HOSPITAL | Age: 71
End: 2021-12-08

## 2021-12-08 NOTE — OUTREACH NOTE
Medical Week 3 Survey      Responses   [facility] patient discharged from? [place]   Does the patient have one of the following disease processes/diagnoses(primary or secondary)? Other   Week 3 attempt successful? Yes   Call start time 1509   Call end time 1512   Discharge diagnosis Primary hypertension   Person spoke with today (if not patient) and relationship Gay  -wife    Meds reviewed with patient/caregiver? Yes   Is the patient having any side effects they believe may be caused by any medication additions or changes? No   Does the patient have all medications ordered at discharge? Yes   Is the patient taking all medications as directed (includes completed medication regime)? Yes   Comments regarding appointments 12/13/21 with Cardiology   Does the patient have a primary care provider?  Yes   Comments regarding PCP Saw PCP a couple days ago.   Does the patient have an appointment with their PCP within 7 days of discharge? Yes   Has the patient kept scheduled appointments due by today? Yes   Has home health visited the patient within 72 hours of discharge? N/A   Psychosocial issues? No   What is the patient's perception of their health status since discharge? Improving   Is the patient/caregiver able to teach back the hierarchy of who to call/visit for symptoms/problems? PCP, Specialist, Home health nurse, Urgent Care, ED, 911 Yes   If the patient is a current smoker, are they able to teach back resources for cessation? Not a smoker   Week 3 Call Completed? Yes          Nelly Mesa RN

## 2021-12-13 ENCOUNTER — OFFICE VISIT (OUTPATIENT)
Dept: CARDIOLOGY | Facility: CLINIC | Age: 71
End: 2021-12-13

## 2021-12-13 VITALS
BODY MASS INDEX: 27.9 KG/M2 | HEIGHT: 72 IN | WEIGHT: 206 LBS | OXYGEN SATURATION: 98 % | HEART RATE: 130 BPM | SYSTOLIC BLOOD PRESSURE: 144 MMHG | DIASTOLIC BLOOD PRESSURE: 75 MMHG

## 2021-12-13 DIAGNOSIS — I25.10 ASCVD (ARTERIOSCLEROTIC CARDIOVASCULAR DISEASE): Chronic | ICD-10-CM

## 2021-12-13 DIAGNOSIS — I10 PRIMARY HYPERTENSION: Chronic | ICD-10-CM

## 2021-12-13 DIAGNOSIS — R07.2 PRECORDIAL PAIN: ICD-10-CM

## 2021-12-13 DIAGNOSIS — I48.91 ATRIAL FIBRILLATION WITH RVR (HCC): Primary | Chronic | ICD-10-CM

## 2021-12-13 PROBLEM — E78.5 DYSLIPIDEMIA: Status: ACTIVE | Noted: 2021-12-13

## 2021-12-13 PROBLEM — E78.5 DYSLIPIDEMIA: Chronic | Status: ACTIVE | Noted: 2021-12-13

## 2021-12-13 PROCEDURE — 99214 OFFICE O/P EST MOD 30 MIN: CPT | Performed by: NURSE PRACTITIONER

## 2021-12-13 PROCEDURE — 93000 ELECTROCARDIOGRAM COMPLETE: CPT | Performed by: NURSE PRACTITIONER

## 2021-12-13 RX ORDER — METOPROLOL TARTRATE 50 MG/1
50 TABLET, FILM COATED ORAL 2 TIMES DAILY
Qty: 180 TABLET | Refills: 2 | Status: SHIPPED | OUTPATIENT
Start: 2021-12-13 | End: 2021-12-28 | Stop reason: SDUPTHER

## 2021-12-13 RX ORDER — METOPROLOL TARTRATE 50 MG/1
50 TABLET, FILM COATED ORAL 2 TIMES DAILY
Qty: 60 TABLET | Refills: 0 | Status: SHIPPED | OUTPATIENT
Start: 2021-12-13 | End: 2021-12-13 | Stop reason: SDUPTHER

## 2021-12-13 NOTE — PROGRESS NOTES
"Chief Complaint  Follow-up    Subjective          Bhavna Astorga presents to [facility] CARDIOLOGY for hospital follow-up.    History of Present Illness    Patient was admitted on 11/16/2021 with diagnosis of new onset atrial fibrillation with RVR.  Patient also reported chest pain.  Echocardiogram showed normal LVEF of 51 to 55%, mild concentric LVH.      Pt reports that he has episodes of shortness of breath and associated weakness.  He reports that he takes a breathing treatment and it eases everything. He is still reporting intermittent chest pain.  He describes as a sharp pain and then a dull pain with pressure on the sides.  He reports irregular heart rate.  He is unsure whether he is having irregular, fast heart rate during his episodes of shortness of breath and/or chest pain.    Objective     Vital Signs:   /75 (BP Location: Left arm, Patient Position: Sitting, Cuff Size: Adult)   Pulse (!) 130   Ht 182.9 cm (72\")   Wt 93.4 kg (206 lb)   SpO2 98%   BMI 27.94 kg/m²       Physical Exam  Constitutional:       Appearance: Normal appearance. He is well-developed.   Cardiovascular:      Rate and Rhythm: Normal rate and regular rhythm.      Heart sounds: No murmur heard.  No friction rub. No gallop.    Pulmonary:      Effort: Pulmonary effort is normal. No respiratory distress.      Breath sounds: Wheezing present. No rales.   Skin:     General: Skin is warm and dry.   Neurological:      Mental Status: He is alert and oriented to person, place, and time.   Psychiatric:         Mood and Affect: Mood normal.         Behavior: Behavior normal.          Result Review :            ECG 12 Lead    Date/Time: 12/13/2021 8:47 AM  Performed by: Ame Glover APRN  Authorized by: Ame Glover APRN   Comparison: compared with previous ECG from 11/17/2021  Similar to previous ECG  Comparison to previous ECG: Atrial fibrillation, 79 bpm  Rhythm: atrial fibrillation  BPM: 109  Comments: " When compared to EKG of 11/17/2021 the patient's heart rate has increased by 30 bpm  QTC three ninety-two             Current Outpatient Medications   Medication Sig Dispense Refill   • albuterol sulfate  (90 Base) MCG/ACT inhaler Inhale 2 puffs Every 4 (Four) Hours As Needed for Wheezing or Shortness of Air. 18 g 8   • aspirin 81 MG EC tablet Take 1 tablet by mouth Daily. 30 tablet 0   • Budeson-Glycopyrrol-Formoterol (Breztri Aerosphere) 160-9-4.8 MCG/ACT aerosol inhaler Inhale 2 puffs 2 (Two) Times a Day. 1 each 8   • ipratropium-albuterol (DUO-NEB) 0.5-2.5 mg/3 ml nebulizer Take 3 mL by nebulization 4 (Four) Times a Day As Needed for Wheezing. 120 mL 5   • losartan (COZAAR) 25 MG tablet Take 25 mg by mouth Daily.     • metoprolol tartrate (LOPRESSOR) 50 MG tablet Take 1 tablet by mouth 2 (Two) Times a Day. 180 tablet 2   • tamsulosin (FLOMAX) 0.4 MG capsule 24 hr capsule Take 1 capsule by mouth Daily.     • traZODone (DESYREL) 50 MG tablet Take 50 mg by mouth Every Night.     • vitamin D (ERGOCALCIFEROL) 1.25 MG (21043 UT) capsule capsule Take 50,000 Units by mouth Every 7 (Seven) Days. PTA: takes on mondays     • apixaban (ELIQUIS) 5 MG tablet tablet Take 1 tablet by mouth Every 12 (Twelve) Hours. Indications: Atrial Fibrillation 42 tablet 0   • gabapentin (NEURONTIN) 300 MG capsule Take 300 mg by mouth 2 (Two) Times a Day.     • pantoprazole (PROTONIX) 20 MG EC tablet Take 20 mg by mouth Daily.     • rosuvastatin (CRESTOR) 40 MG tablet Take 40 mg by mouth Daily.       No current facility-administered medications for this visit.            Assessment and Plan    Problem List Items Addressed This Visit        Cardiac and Vasculature    Atrial fibrillation with RVR (HCC) - Primary (Chronic)    Overview     · 11/17/2021 new diagnosis atrial fibrillation.   · [id number] is at least  4  for hypertension, diabetes, CAD, and age  · Chronically anticoagulated on Eliquis 5 twice daily         Relevant  Medications    metoprolol tartrate (LOPRESSOR) 50 MG tablet    Primary hypertension (Chronic)    Relevant Medications    metoprolol tartrate (LOPRESSOR) 50 MG tablet    ASCVD (arteriosclerotic cardiovascular disease) (Chronic)    Overview     · 2004, two stents placed, exact details unknown  · Before 2010, additional two stents placed, exact details unknown           Other Visit Diagnoses     Precordial pain        Relevant Orders    Stress Test With Myocardial Perfusion One Day              Follow Up     Medications were reviewed with the patient.    With regard to atrial fibrillation with RVR, patient's heart rate today is elevated, I have increased his metoprolol to 50 mg twice daily.  Continue Eliquis for stroke prophylaxis.    With regard to hypertension, he does show modest control the increase in metoprolol will likely take care of this.    With regard to ASCVD, continue aspirin, losartan, metoprolol and rosuvastatin.    With regard to patient's report of chest pain, nuclear stress test has been added.    Return in about 2 weeks (around 12/27/2021).    Patient was given instructions and counseling regarding his condition or for health maintenance advice. Please see specific information pulled into the AVS if appropriate.

## 2021-12-16 ENCOUNTER — READMISSION MANAGEMENT (OUTPATIENT)
Dept: CALL CENTER | Facility: HOSPITAL | Age: 71
End: 2021-12-16

## 2021-12-21 ENCOUNTER — PATIENT ROUNDING (BHMG ONLY) (OUTPATIENT)
Dept: CARDIOLOGY | Facility: CLINIC | Age: 71
End: 2021-12-21

## 2021-12-21 NOTE — PROGRESS NOTES
December 21, 2021    Hello, may I speak with Bhavna Astorga?    My name is Marleni      [facility] am  [facility]  [facility] CARDIOLOGY  [address]  [place] [address] [phone].    Before we get started may I verify your date of birth? 1950    I am calling to officially welcome you to our practice and ask about your recent visit. Is this a good time to talk? yes    Tell me about your visit with us. What things went well?  We really liked it there everyone is so nice.       We're always looking for ways to make our patients' experiences even better. Do you have recommendations on ways we may improve?  no    Overall were you satisfied with your first visit to our practice? yes       I appreciate you taking the time to speak with me today. Is there anything else I can do for you? no      Thank you, and have a great day.

## 2021-12-23 ENCOUNTER — HOSPITAL ENCOUNTER (OUTPATIENT)
Dept: NUCLEAR MEDICINE | Facility: HOSPITAL | Age: 71
Discharge: HOME | End: 2021-12-23

## 2021-12-23 ENCOUNTER — HOSPITAL ENCOUNTER (OUTPATIENT)
Dept: CARDIOLOGY | Facility: HOSPITAL | Age: 71
Discharge: HOME | End: 2021-12-23

## 2021-12-23 DIAGNOSIS — R07.2 PRECORDIAL PAIN: ICD-10-CM

## 2021-12-23 PROCEDURE — 78452 HT MUSCLE IMAGE SPECT MULT: CPT | Performed by: INTERNAL MEDICINE

## 2021-12-23 PROCEDURE — 25010000002 REGADENOSON 0.4 MG/5ML SOLUTION: Performed by: NURSE PRACTITIONER

## 2021-12-23 PROCEDURE — 78452 HT MUSCLE IMAGE SPECT MULT: CPT

## 2021-12-23 PROCEDURE — 93018 CV STRESS TEST I&R ONLY: CPT | Performed by: INTERNAL MEDICINE

## 2021-12-23 PROCEDURE — A9500 TC99M SESTAMIBI: HCPCS | Performed by: NURSE PRACTITIONER

## 2021-12-23 PROCEDURE — 0 TECHNETIUM SESTAMIBI: Performed by: NURSE PRACTITIONER

## 2021-12-23 PROCEDURE — 93017 CV STRESS TEST TRACING ONLY: CPT

## 2021-12-23 RX ADMIN — TECHNETIUM TC 99M SESTAMIBI 1 DOSE: 1 INJECTION INTRAVENOUS at 09:00

## 2021-12-23 RX ADMIN — REGADENOSON 0.4 MG: 0.08 INJECTION, SOLUTION INTRAVENOUS at 10:13

## 2021-12-23 RX ADMIN — TECHNETIUM TC 99M SESTAMIBI 1 DOSE: 1 INJECTION INTRAVENOUS at 10:13

## 2021-12-28 ENCOUNTER — OFFICE VISIT (OUTPATIENT)
Dept: PULMONOLOGY | Facility: CLINIC | Age: 71
End: 2021-12-28

## 2021-12-28 ENCOUNTER — OFFICE VISIT (OUTPATIENT)
Dept: CARDIOLOGY | Facility: CLINIC | Age: 71
End: 2021-12-28

## 2021-12-28 VITALS
HEART RATE: 96 BPM | TEMPERATURE: 96.8 F | OXYGEN SATURATION: 97 % | BODY MASS INDEX: 27.5 KG/M2 | WEIGHT: 203 LBS | HEIGHT: 72 IN | SYSTOLIC BLOOD PRESSURE: 150 MMHG | DIASTOLIC BLOOD PRESSURE: 86 MMHG

## 2021-12-28 VITALS
SYSTOLIC BLOOD PRESSURE: 135 MMHG | WEIGHT: 206 LBS | OXYGEN SATURATION: 95 % | DIASTOLIC BLOOD PRESSURE: 80 MMHG | HEART RATE: 96 BPM | BODY MASS INDEX: 27.9 KG/M2 | HEIGHT: 72 IN

## 2021-12-28 DIAGNOSIS — I10 PRIMARY HYPERTENSION: Chronic | ICD-10-CM

## 2021-12-28 DIAGNOSIS — E78.5 DYSLIPIDEMIA: Chronic | ICD-10-CM

## 2021-12-28 DIAGNOSIS — I48.91 ATRIAL FIBRILLATION WITH RVR (HCC): Chronic | ICD-10-CM

## 2021-12-28 DIAGNOSIS — J44.9 CHRONIC OBSTRUCTIVE PULMONARY DISEASE, UNSPECIFIED COPD TYPE (HCC): Primary | ICD-10-CM

## 2021-12-28 DIAGNOSIS — J96.11 CHRONIC RESPIRATORY FAILURE WITH HYPOXIA (HCC): ICD-10-CM

## 2021-12-28 DIAGNOSIS — J60 CWP (COALWORKERS PNEUMOCONIOSIS) (HCC): ICD-10-CM

## 2021-12-28 DIAGNOSIS — Z87.891 FORMER SMOKER: ICD-10-CM

## 2021-12-28 DIAGNOSIS — I25.10 ASCVD (ARTERIOSCLEROTIC CARDIOVASCULAR DISEASE): Primary | Chronic | ICD-10-CM

## 2021-12-28 PROCEDURE — 99213 OFFICE O/P EST LOW 20 MIN: CPT | Performed by: NURSE PRACTITIONER

## 2021-12-28 PROCEDURE — 99214 OFFICE O/P EST MOD 30 MIN: CPT | Performed by: PHYSICIAN ASSISTANT

## 2021-12-28 RX ORDER — FUROSEMIDE 40 MG/1
40 TABLET ORAL DAILY
COMMUNITY
Start: 2021-12-18 | End: 2021-12-28 | Stop reason: SDUPTHER

## 2021-12-28 RX ORDER — FUROSEMIDE 40 MG/1
40 TABLET ORAL DAILY
Qty: 30 TABLET | Refills: 11 | Status: SHIPPED | OUTPATIENT
Start: 2021-12-28 | End: 2022-11-17 | Stop reason: SDUPTHER

## 2021-12-28 RX ORDER — POTASSIUM CHLORIDE 1500 MG/1
20 TABLET, FILM COATED, EXTENDED RELEASE ORAL DAILY
Qty: 30 TABLET | Refills: 11 | Status: SHIPPED | OUTPATIENT
Start: 2021-12-28 | End: 2022-11-19 | Stop reason: SDUPTHER

## 2021-12-28 RX ORDER — METOPROLOL TARTRATE 50 MG/1
75 TABLET, FILM COATED ORAL 2 TIMES DAILY
Qty: 180 TABLET | Refills: 2 | Status: SHIPPED | OUTPATIENT
Start: 2021-12-28 | End: 2022-03-28

## 2021-12-28 NOTE — PROGRESS NOTES
"Chief Complaint  Follow-up    Subjective          Bhavna Astorga presents to [facility] CARDIOLOGY for follow-up.    History of Present Illness    Patient was last seen on 12/13/2021 as follow-up for new diagnosis of atrial fibrillation with RVR.  Echocardiogram showed normal LVEF of 51 to 55% and mild concentric LVH.    At today's visit Mr. Astorga denies any chest pain.  He does report shortness of breath and dyspnea on exertion.  Chronic for him and not worsening.  He reports that he has black lung.    Objective     Vital Signs:   /80 (BP Location: Left arm, Patient Position: Sitting, Cuff Size: Adult)   Pulse 96   Ht 182.9 cm (72\")   Wt 93.4 kg (206 lb)   SpO2 95%   BMI 27.94 kg/m²       Physical Exam  Constitutional:       Appearance: Normal appearance. He is well-developed.   Cardiovascular:      Rate and Rhythm: Normal rate and regular rhythm.      Heart sounds: No murmur heard.  No friction rub. No gallop.    Pulmonary:      Effort: Pulmonary effort is normal. No respiratory distress.      Breath sounds: Decreased air movement present. No wheezing or rales.   Skin:     General: Skin is warm and dry.   Neurological:      Mental Status: He is alert and oriented to person, place, and time.   Psychiatric:         Mood and Affect: Mood normal.         Behavior: Behavior normal.          Result Review :                Current Outpatient Medications   Medication Sig Dispense Refill   • albuterol sulfate  (90 Base) MCG/ACT inhaler Inhale 2 puffs Every 4 (Four) Hours As Needed for Wheezing or Shortness of Air. 18 g 8   • apixaban (ELIQUIS) 5 MG tablet tablet Take 1 tablet by mouth Every 12 (Twelve) Hours. Indications: Atrial Fibrillation 42 tablet 0   • aspirin 81 MG EC tablet Take 1 tablet by mouth Daily. 30 tablet 0   • Budeson-Glycopyrrol-Formoterol (Breztri Aerosphere) 160-9-4.8 MCG/ACT aerosol inhaler Inhale 2 puffs 2 (Two) Times a Day. 1 each 8   • dapagliflozin (FARXIGA) 5 " MG tablet tablet Take 5 mg by mouth Daily.     • furosemide (LASIX) 40 MG tablet Take 1 tablet by mouth Daily. 30 tablet 11   • gabapentin (NEURONTIN) 300 MG capsule Take 300 mg by mouth 2 (Two) Times a Day.     • ipratropium-albuterol (DUO-NEB) 0.5-2.5 mg/3 ml nebulizer Take 3 mL by nebulization 4 (Four) Times a Day As Needed for Wheezing. 120 mL 5   • losartan (COZAAR) 25 MG tablet Take 25 mg by mouth Daily.     • metoprolol tartrate (LOPRESSOR) 50 MG tablet Take 1.5 tablets by mouth 2 (Two) Times a Day. 180 tablet 2   • pantoprazole (PROTONIX) 20 MG EC tablet Take 20 mg by mouth Daily.     • rosuvastatin (CRESTOR) 40 MG tablet Take 40 mg by mouth Daily.     • sitaGLIPtin-metFORMIN (JANUMET)  MG per tablet Take 1 tablet by mouth 2 (Two) Times a Day With Meals.     • tamsulosin (FLOMAX) 0.4 MG capsule 24 hr capsule Take 1 capsule by mouth Daily.     • traZODone (DESYREL) 50 MG tablet Take 50 mg by mouth Every Night.     • vitamin D (ERGOCALCIFEROL) 1.25 MG (89147 UT) capsule capsule Take 50,000 Units by mouth Every 7 (Seven) Days. PTA: takes on mondays     • potassium chloride (K-TAB) 20 MEQ tablet controlled-release ER tablet Take 1 tablet by mouth Daily. 30 tablet 11     No current facility-administered medications for this visit.            Assessment and Plan    Problem List Items Addressed This Visit        Cardiac and Vasculature    Atrial fibrillation with RVR (HCC) (Chronic)    Overview     · 11/17/2021 new diagnosis atrial fibrillation.   · [id number] is at least  4  for hypertension, diabetes, CAD, and age  · Chronically anticoagulated on Eliquis 5 twice daily         Relevant Medications    metoprolol tartrate (LOPRESSOR) 50 MG tablet    Primary hypertension (Chronic)    Relevant Medications    furosemide (LASIX) 40 MG tablet    metoprolol tartrate (LOPRESSOR) 50 MG tablet    ASCVD (arteriosclerotic cardiovascular disease) - Primary (Chronic)    Overview     · 2004, two stents placed, exact  details unknown  · Before 2010, additional two stents placed, exact details unknown         Relevant Medications    furosemide (LASIX) 40 MG tablet    potassium chloride (K-TAB) 20 MEQ tablet controlled-release ER tablet    Dyslipidemia (Chronic)              Follow Up     Medications were reviewed with the patient.    ASCVD is stable.  Patient is to continue aspirin, losartan, metoprolol, and rosuvastatin.    Continue Eliquis for stroke prophylaxis in the presence of atrial fibrillation.    Hypertension is controlled.    With regard to dyslipidemia, this is followed by his primary care provider.  Continue rosuvastatin.    Risk factor modification: The patient was counseled regarding the correlation between uncontrolled diabetes and coronary artery disease.  The patient was urged to follow an appropriate diet with very limited simple carbohydrates and discuss with their primary care provider possible strategies to control blood sugar, including additional medications.    Return in about 3 months (around 3/28/2022).    Patient was given instructions and counseling regarding his condition or for health maintenance advice. Please see specific information pulled into the AVS if appropriate.

## 2021-12-28 NOTE — PROGRESS NOTES
"Chief Complaint  COPD    Subjective          Bhavna Astorga presents to [facility] PULMONARY & CRITICAL CARE MEDICINE  History of Present Illness     Mr. Astorga presents today for follow up of COPD, coal workers pneumoconiosis, and former smoking history. At the previous visit, EKG was completed due to increased shortness of breath and irregular rhythm/rate. EKG revealed new only atrial fibrillation, and achieved regulation after medication adjustments during hospitalization.   After this episode, he later required a brief stay at [facility] due to chest pain. Stress testing revealed no significant blockage. He followed up with cardiology today.   Since better cardiac arrhythmia regulation, he notes improvement in shortness of breath. He will occasionally note thickened phlegm production, but otherwise better tolerates dyspnea on exertion. He has discontinued Breztri as he felt that this inhaler was too much for his current symptoms and not needed. He has also previously tried stiolto, which was better tolerated.   After the hospitalization at [facility], he was discharged with supplemental oxygen supplies. He uses supplies on 2 L/m at nighttime and as needed during the daytime. Notes more restful sleep with nighttime use. He is interested in obtaining a POC for easier ambulatory use.       Objective   Vital Signs:   /86   Pulse 96   Temp 96.8 °F (36 °C) (Temporal)   Ht 182.9 cm (72\")   Wt 92.1 kg (203 lb)   SpO2 97%   BMI 27.53 kg/m²     Physical Exam  Vitals reviewed.   Constitutional:       General: He is not in acute distress.     Appearance: He is well-developed. He is not diaphoretic.   HENT:      Head: Normocephalic and atraumatic.   Neck:      Thyroid: No thyromegaly.   Cardiovascular:      Rate and Rhythm: Normal rate and regular rhythm.      Heart sounds: Normal heart sounds, S1 normal and S2 normal.   Pulmonary:      Effort: Pulmonary effort is normal.      Breath sounds: No " wheezing, rhonchi or rales.   Neurological:      Mental Status: He is alert and oriented to person, place, and time.   Psychiatric:         Behavior: Behavior normal.          Result Review :   The following data was reviewed by: Denise Walls PA-C on 12/28/2021:    Reviewed recent labs.  Reviewed recent chest x-ray imaging in comparison with prior chest x-rays.  Reviewed previous PFT  Reviewed previous echocardiogram report and pending stress test report.      Assessment and Plan    Diagnoses and all orders for this visit:    1. Chronic obstructive pulmonary disease, unspecified COPD type (HCC) (Primary)  -     Miscellaneous DME    2. CWP (coalworkers pneumoconiosis) (HCC)    3. Former smoker    4. Chronic respiratory failure with hypoxia (HCC)  -     Miscellaneous DME      COPD, coal workers pneumoconiosis:  · Continue albuterol inhaler as needed  · Continue DuoNebs as needed  · Provided sample of Spiriva respimat 2.5 mcg, recommended 1 to 2 puffs daily  Shortness of breath improving after maintenance of A. fib with RVR   felt that breztri was too many medications, also previously tried Stiolto.   We will restart with level on maintenance therapy of LAMA.   · Now follows with cardiology for recently diagnosed atrial fibrillation with RVR.      Chronic complex respiratory failure:  Called for similar oxygen during recent hospitalization at [place].  · Using supplies of 2 L/min at nighttime and as needed during the daytime. Tolerating room air today.  · Ordered portable concentrator for ambulatory use.      Former smoker:  Patient no longer qualifies for lung cancer screening CTs due to year since cessation.  Notable for an approximately 36-year history of 1.5 ppd average.  · Recent x-ray reviewed. No significant fibrotic changes.        Follow Up   Return in about 6 months (around 6/28/2022), or as needed, for Next scheduled follow up.  Patient was given instructions and counseling regarding his condition or for  health maintenance advice. Please see specific information pulled into the AVS if appropriate.

## 2021-12-29 PROBLEM — J96.11 CHRONIC RESPIRATORY FAILURE WITH HYPOXIA (HCC): Status: ACTIVE | Noted: 2021-12-29

## 2021-12-29 LAB
BH CV NUCLEAR PRIOR STUDY: 3
BH CV REST NUCLEAR ISOTOPE DOSE: 9.6 MCI
BH CV STRESS BP STAGE 1: NORMAL
BH CV STRESS BP STAGE 2: NORMAL
BH CV STRESS COMMENTS STAGE 1: NORMAL
BH CV STRESS COMMENTS STAGE 2: NORMAL
BH CV STRESS DOSE REGADENOSON STAGE 1: 0.4
BH CV STRESS DURATION MIN STAGE 1: 0
BH CV STRESS DURATION MIN STAGE 2: 4
BH CV STRESS DURATION SEC STAGE 1: 10
BH CV STRESS DURATION SEC STAGE 2: 0
BH CV STRESS HR STAGE 1: 95
BH CV STRESS HR STAGE 2: 112
BH CV STRESS NUCLEAR ISOTOPE DOSE: 29.5 MCI
BH CV STRESS PROTOCOL 1: NORMAL
BH CV STRESS RECOVERY BP: NORMAL MMHG
BH CV STRESS RECOVERY HR: 99 BPM
BH CV STRESS STAGE 1: 1
BH CV STRESS STAGE 2: 2
LV EF NUC BP: 48 %
MAXIMAL PREDICTED HEART RATE: 147 BPM
PERCENT MAX PREDICTED HR: 76.19 %
STRESS BASELINE BP: NORMAL MMHG
STRESS BASELINE HR: 82 BPM
STRESS PERCENT HR: 90 %
STRESS POST PEAK BP: NORMAL MMHG
STRESS POST PEAK HR: 112 BPM
STRESS TARGET HR: 125 BPM

## 2022-01-24 NOTE — OUTREACH NOTE
Medical Week 4 Survey      Responses   [facility] patient discharged from? [place]   Does the patient have one of the following disease processes/diagnoses(primary or secondary)? Other   Week 4 attempt successful? No          Paola Villalpando RN   [place]

## 2022-03-28 ENCOUNTER — OFFICE VISIT (OUTPATIENT)
Dept: CARDIOLOGY | Facility: CLINIC | Age: 72
End: 2022-03-28

## 2022-03-28 VITALS
HEART RATE: 93 BPM | OXYGEN SATURATION: 94 % | SYSTOLIC BLOOD PRESSURE: 168 MMHG | WEIGHT: 210 LBS | BODY MASS INDEX: 28.44 KG/M2 | HEIGHT: 72 IN | DIASTOLIC BLOOD PRESSURE: 81 MMHG

## 2022-03-28 DIAGNOSIS — I25.10 ASCVD (ARTERIOSCLEROTIC CARDIOVASCULAR DISEASE): Primary | Chronic | ICD-10-CM

## 2022-03-28 DIAGNOSIS — I10 PRIMARY HYPERTENSION: Chronic | ICD-10-CM

## 2022-03-28 DIAGNOSIS — I48.91 ATRIAL FIBRILLATION WITH RVR: Chronic | ICD-10-CM

## 2022-03-28 DIAGNOSIS — E78.5 DYSLIPIDEMIA: Chronic | ICD-10-CM

## 2022-03-28 PROCEDURE — 99214 OFFICE O/P EST MOD 30 MIN: CPT | Performed by: NURSE PRACTITIONER

## 2022-03-28 RX ORDER — METOPROLOL TARTRATE 50 MG/1
50 TABLET, FILM COATED ORAL 2 TIMES DAILY
Qty: 180 TABLET | Refills: 2 | Status: SHIPPED | OUTPATIENT
Start: 2022-03-28 | End: 2022-11-19 | Stop reason: SDUPTHER

## 2022-03-29 ENCOUNTER — OFFICE VISIT (OUTPATIENT)
Dept: PULMONOLOGY | Facility: CLINIC | Age: 72
End: 2022-03-29

## 2022-03-29 VITALS
TEMPERATURE: 96.9 F | OXYGEN SATURATION: 95 % | HEART RATE: 101 BPM | DIASTOLIC BLOOD PRESSURE: 70 MMHG | BODY MASS INDEX: 28.44 KG/M2 | HEIGHT: 72 IN | WEIGHT: 210 LBS | SYSTOLIC BLOOD PRESSURE: 122 MMHG

## 2022-03-29 DIAGNOSIS — J60 CWP (COALWORKERS PNEUMOCONIOSIS): ICD-10-CM

## 2022-03-29 DIAGNOSIS — J96.11 CHRONIC RESPIRATORY FAILURE WITH HYPOXIA: Primary | ICD-10-CM

## 2022-03-29 DIAGNOSIS — J44.9 CHRONIC OBSTRUCTIVE PULMONARY DISEASE, UNSPECIFIED COPD TYPE: ICD-10-CM

## 2022-03-29 PROCEDURE — 99214 OFFICE O/P EST MOD 30 MIN: CPT | Performed by: PHYSICIAN ASSISTANT

## 2022-03-29 RX ORDER — GUAIFENESIN 600 MG/1
600 TABLET, EXTENDED RELEASE ORAL 2 TIMES DAILY PRN
Qty: 14 TABLET | Refills: 3 | Status: SHIPPED | OUTPATIENT
Start: 2022-03-29 | End: 2022-04-05

## 2022-03-29 RX ORDER — DOXYCYCLINE HYCLATE 100 MG/1
100 CAPSULE ORAL 2 TIMES DAILY
Qty: 10 CAPSULE | Refills: 0 | Status: SHIPPED | OUTPATIENT
Start: 2022-03-29 | End: 2022-04-03

## 2022-03-29 RX ORDER — ALBUTEROL SULFATE 90 UG/1
2 AEROSOL, METERED RESPIRATORY (INHALATION) EVERY 4 HOURS PRN
Qty: 18 G | Refills: 8 | Status: SHIPPED | OUTPATIENT
Start: 2022-03-29 | End: 2022-07-21 | Stop reason: SDUPTHER

## 2022-03-29 RX ORDER — BUDESONIDE, GLYCOPYRROLATE, AND FORMOTEROL FUMARATE 160; 9; 4.8 UG/1; UG/1; UG/1
2 AEROSOL, METERED RESPIRATORY (INHALATION) 2 TIMES DAILY
Qty: 1 EACH | Refills: 8 | Status: SHIPPED | OUTPATIENT
Start: 2022-03-29 | End: 2022-07-21 | Stop reason: SDUPTHER

## 2022-03-29 RX ORDER — IPRATROPIUM BROMIDE AND ALBUTEROL SULFATE 2.5; .5 MG/3ML; MG/3ML
3 SOLUTION RESPIRATORY (INHALATION) 4 TIMES DAILY PRN
Qty: 120 ML | Refills: 8 | Status: SHIPPED | OUTPATIENT
Start: 2022-03-29 | End: 2023-01-23 | Stop reason: SDUPTHER

## 2022-03-29 NOTE — PROGRESS NOTES
"Chief Complaint  COPD    Subjective          Bhavna Astorga presents to [facility] PULMONARY & CRITICAL CARE MEDICINE  History of Present Illness     Patient presents today for follow-up of COPD, coal workers pneumoconiosis, chronic hypoxic respiratory failure, and former smoking history.  He is attempted to increase physical activity, but states that this is complicated by shortness of breath and intermittent palpitations/chest discomfort in the setting of paroxysmal A. fib.  He is completing follows with cardiology and compliant with current medications.  He states that heart rate has recently ranged from the 100s to the mid 60s.  After being more active in the cooler weather, he has noted increase of chest congestion and increased shortness of breath.  He has returned to use of breztri as this is most beneficial.  Continues with rescue medications as needed.  He states that a mucus produced is thick in nature.  Symptoms have been increased from baseline over the last several days.  Remains a former smoker.    Objective   Vital Signs:   /70   Pulse 101   Temp 96.9 °F (36.1 °C) (Temporal)   Ht 182.9 cm (72\")   Wt 95.3 kg (210 lb)   SpO2 95%   BMI 28.48 kg/m²     BMI is above normal parameters. Recommendations: referral to primary care       Physical Exam  Vitals reviewed.   Constitutional:       General: He is not in acute distress.     Appearance: He is well-developed. He is not diaphoretic.   HENT:      Head: Normocephalic and atraumatic.   Neck:      Thyroid: No thyromegaly.   Cardiovascular:      Rate and Rhythm: Rhythm irregular.      Heart sounds: Normal heart sounds, S1 normal and S2 normal.      Comments: Borderline tachycardic rate.  Fluctuated from the low 100s to 90s upon recheck.  Pulmonary:      Effort: Pulmonary effort is normal.      Breath sounds: Rales present. No wheezing or rhonchi.      Comments: Faint crackles of the right upper lung field.  Neurological:      " Mental Status: He is alert and oriented to person, place, and time.   Psychiatric:         Behavior: Behavior normal.        Result Review :   The following data was reviewed by: Denise Walls PA-C on 03/29/2022:    Reviewed the chest x-ray from November 2021.    Reviewed previous PFT from 2019.        Assessment and Plan    Diagnoses and all orders for this visit:    1. Chronic respiratory failure with hypoxia (HCC) (Primary)  -     Miscellaneous DME    2. CWP (coalworkers pneumoconiosis) ([facility])  -     albuterol sulfate  (90 Base) MCG/ACT inhaler; Inhale 2 puffs Every 4 (Four) Hours As Needed for Wheezing or Shortness of Air.  Dispense: 18 g; Refill: 8  -     Budeson-Glycopyrrol-Formoterol (Breztri Aerosphere) 160-9-4.8 MCG/ACT aerosol inhaler; Inhale 2 puffs 2 (Two) Times a Day.  Dispense: 1 each; Refill: 8  -     Miscellaneous DME  -     ipratropium-albuterol (DUO-NEB) 0.5-2.5 mg/3 ml nebulizer; Take 3 mL by nebulization 4 (Four) Times a Day As Needed for Wheezing or Shortness of Air.  Dispense: 120 mL; Refill: 8  -     doxycycline (VIBRAMYCIN) 100 MG capsule; Take 1 capsule by mouth 2 (Two) Times a Day for 5 days.  Dispense: 10 capsule; Refill: 0  -     guaiFENesin (Mucinex) 600 MG 12 hr tablet; Take 1 tablet by mouth 2 (Two) Times a Day As Needed for Cough or Congestion (congestion) for up to 7 days.  Dispense: 14 tablet; Refill: 3    3. Chronic obstructive pulmonary disease, unspecified COPD type ([facility])  -     albuterol sulfate  (90 Base) MCG/ACT inhaler; Inhale 2 puffs Every 4 (Four) Hours As Needed for Wheezing or Shortness of Air.  Dispense: 18 g; Refill: 8  -     Budeson-Glycopyrrol-Formoterol (Breztri Aerosphere) 160-9-4.8 MCG/ACT aerosol inhaler; Inhale 2 puffs 2 (Two) Times a Day.  Dispense: 1 each; Refill: 8  -     Miscellaneous DME  -     ipratropium-albuterol (DUO-NEB) 0.5-2.5 mg/3 ml nebulizer; Take 3 mL by nebulization 4 (Four) Times a Day As Needed for Wheezing or Shortness of Air.   Dispense: 120 mL; Refill: 8  -     doxycycline (VIBRAMYCIN) 100 MG capsule; Take 1 capsule by mouth 2 (Two) Times a Day for 5 days.  Dispense: 10 capsule; Refill: 0  -     guaiFENesin (Mucinex) 600 MG 12 hr tablet; Take 1 tablet by mouth 2 (Two) Times a Day As Needed for Cough or Congestion (congestion) for up to 7 days.  Dispense: 14 tablet; Refill: 3      COPD, coal workers pneumoconiosis, concern for acute exacerbation versus developing pneumonia:  · Continue albuterol inhaler as needed  · Continue DuoNebs as needed  · Continues on breztri as scheduled.  · follows with cardiology for atrial fibrillation with RVR  · Holding on oral steroids as there is no current wheezing.  · Order Mucinex tablets, recommend 7-day use for congestion  · Ordered 5-day course of doxycycline       Chronic hypoxic respiratory failure:  · Compliant with as needed use during the daytime and 2 L/min use at nighttime.  · DME order placed for portable oxygen concentrator, this would allow for easier use with ambulation as compared to the portable tanks.       Former smoker:  Patient no longer qualifies for lung cancer screening CTs due to year since cessation.  Notable for an approximately 36-year history of 1.5 ppd average.  · May consider repeat x-ray in 1 year or sooner if needed.        Follow Up   Return in about 4 months (around 7/29/2022), or if symptoms worsen or fail to improve, for Next scheduled follow up.  Patient was given instructions and counseling regarding his condition or for health maintenance advice. Please see specific information pulled into the AVS if appropriate.

## 2022-05-16 ENCOUNTER — OFFICE VISIT (OUTPATIENT)
Dept: CARDIOLOGY | Facility: CLINIC | Age: 72
End: 2022-05-16

## 2022-05-16 VITALS
HEIGHT: 72 IN | WEIGHT: 213.8 LBS | SYSTOLIC BLOOD PRESSURE: 150 MMHG | OXYGEN SATURATION: 95 % | HEART RATE: 84 BPM | BODY MASS INDEX: 28.96 KG/M2 | DIASTOLIC BLOOD PRESSURE: 91 MMHG

## 2022-05-16 DIAGNOSIS — E78.5 DYSLIPIDEMIA: Chronic | ICD-10-CM

## 2022-05-16 DIAGNOSIS — I25.10 ASCVD (ARTERIOSCLEROTIC CARDIOVASCULAR DISEASE): Primary | Chronic | ICD-10-CM

## 2022-05-16 DIAGNOSIS — I10 PRIMARY HYPERTENSION: Chronic | ICD-10-CM

## 2022-05-16 DIAGNOSIS — I48.91 ATRIAL FIBRILLATION WITH RVR: Chronic | ICD-10-CM

## 2022-05-16 PROCEDURE — 99213 OFFICE O/P EST LOW 20 MIN: CPT | Performed by: NURSE PRACTITIONER
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
## 2022-06-22 ENCOUNTER — TELEPHONE (OUTPATIENT)
Dept: PULMONOLOGY | Facility: CLINIC | Age: 72
End: 2022-06-22

## 2022-06-22 RX ORDER — AMOXICILLIN AND CLAVULANATE POTASSIUM 875; 125 MG/1; MG/1
1 TABLET, FILM COATED ORAL 2 TIMES DAILY
Qty: 10 TABLET | Refills: 0 | Status: SHIPPED | OUTPATIENT
Start: 2022-06-22 | End: 2022-06-27

## 2022-06-22 RX ORDER — PREDNISONE 20 MG/1
40 TABLET ORAL DAILY
Qty: 10 TABLET | Refills: 0 | Status: SHIPPED | OUTPATIENT
Start: 2022-06-22 | End: 2022-06-27

## 2022-06-22 NOTE — TELEPHONE ENCOUNTER
Mrs. Astorga called the office, concerned the for respiratory infection.   Ordered oral antibiotics for 5 days.   Ordered oral prednisone for 5 days.     Call again or seek further evaluation as needed if symptoms do not improve or worsen.

## 2022-07-21 ENCOUNTER — OFFICE VISIT (OUTPATIENT)
Dept: PULMONOLOGY | Facility: CLINIC | Age: 72
End: 2022-07-21

## 2022-07-21 VITALS
DIASTOLIC BLOOD PRESSURE: 88 MMHG | WEIGHT: 215.6 LBS | TEMPERATURE: 96.8 F | BODY MASS INDEX: 29.2 KG/M2 | SYSTOLIC BLOOD PRESSURE: 142 MMHG | HEART RATE: 77 BPM | HEIGHT: 72 IN | OXYGEN SATURATION: 97 %

## 2022-07-21 DIAGNOSIS — J44.9 CHRONIC OBSTRUCTIVE PULMONARY DISEASE, UNSPECIFIED COPD TYPE: ICD-10-CM

## 2022-07-21 DIAGNOSIS — J60 CWP (COALWORKERS PNEUMOCONIOSIS): ICD-10-CM

## 2022-07-21 PROCEDURE — 99214 OFFICE O/P EST MOD 30 MIN: CPT | Performed by: PHYSICIAN ASSISTANT

## 2022-07-21 RX ORDER — BUDESONIDE, GLYCOPYRROLATE, AND FORMOTEROL FUMARATE 160; 9; 4.8 UG/1; UG/1; UG/1
2 AEROSOL, METERED RESPIRATORY (INHALATION) 2 TIMES DAILY
Qty: 1 EACH | Refills: 8 | Status: SHIPPED | OUTPATIENT
Start: 2022-07-21 | End: 2023-01-23 | Stop reason: SDUPTHER

## 2022-07-21 RX ORDER — ALBUTEROL SULFATE 90 UG/1
2 AEROSOL, METERED RESPIRATORY (INHALATION) EVERY 4 HOURS PRN
Qty: 18 G | Refills: 8 | Status: SHIPPED | OUTPATIENT
Start: 2022-07-21 | End: 2023-01-23 | Stop reason: SDUPTHER

## 2022-09-07 ENCOUNTER — TELEPHONE (OUTPATIENT)
Dept: PULMONOLOGY | Facility: CLINIC | Age: 72
End: 2022-09-07

## 2022-09-07 RX ORDER — AMOXICILLIN AND CLAVULANATE POTASSIUM 875; 125 MG/1; MG/1
1 TABLET, FILM COATED ORAL 2 TIMES DAILY
Qty: 10 TABLET | Refills: 0 | Status: SHIPPED | OUTPATIENT
Start: 2022-09-07 | End: 2022-09-12

## 2022-09-07 RX ORDER — PREDNISONE 20 MG/1
40 TABLET ORAL DAILY
Qty: 10 TABLET | Refills: 0 | Status: SHIPPED | OUTPATIENT
Start: 2022-09-07 | End: 2022-09-12

## 2022-11-03 ENCOUNTER — TELEPHONE (OUTPATIENT)
Dept: PULMONOLOGY | Facility: CLINIC | Age: 72
End: 2022-11-03

## 2022-11-03 RX ORDER — PREDNISONE 20 MG/1
40 TABLET ORAL DAILY
Qty: 10 TABLET | Refills: 0 | Status: SHIPPED | OUTPATIENT
Start: 2022-11-03 | End: 2023-01-23

## 2022-11-03 RX ORDER — DOXYCYCLINE HYCLATE 100 MG/1
100 CAPSULE ORAL 2 TIMES DAILY
Qty: 10 CAPSULE | Refills: 0 | Status: SHIPPED | OUTPATIENT
Start: 2022-11-03 | End: 2022-11-08

## 2022-11-03 NOTE — TELEPHONE ENCOUNTER
Pt called to advise that he has infection in his lungs and would like some medicine sent in to [facility] in [place].

## 2022-11-03 NOTE — TELEPHONE ENCOUNTER
Ordered Doxycyline and Prednisone 5 day course. Updated the patient's wife that prescriptions would be available at the pharmacy.   Seek further evaluation as needed if symptoms do not improve or worsen.

## 2022-11-17 ENCOUNTER — OFFICE VISIT (OUTPATIENT)
Dept: CARDIOLOGY | Facility: CLINIC | Age: 72
End: 2022-11-17

## 2022-11-17 VITALS
BODY MASS INDEX: 28.94 KG/M2 | OXYGEN SATURATION: 95 % | DIASTOLIC BLOOD PRESSURE: 74 MMHG | WEIGHT: 213.4 LBS | SYSTOLIC BLOOD PRESSURE: 114 MMHG | HEART RATE: 99 BPM

## 2022-11-17 DIAGNOSIS — I10 PRIMARY HYPERTENSION: Chronic | ICD-10-CM

## 2022-11-17 DIAGNOSIS — I48.91 ATRIAL FIBRILLATION WITH RVR: Primary | Chronic | ICD-10-CM

## 2022-11-17 DIAGNOSIS — I25.10 ASCVD (ARTERIOSCLEROTIC CARDIOVASCULAR DISEASE): Chronic | ICD-10-CM

## 2022-11-17 DIAGNOSIS — E78.5 DYSLIPIDEMIA: Chronic | ICD-10-CM

## 2022-11-17 PROCEDURE — 99214 OFFICE O/P EST MOD 30 MIN: CPT | Performed by: NURSE PRACTITIONER

## 2022-11-17 RX ORDER — FUROSEMIDE 40 MG/1
40 TABLET ORAL DAILY
Qty: 30 TABLET | Refills: 0 | Status: SHIPPED | OUTPATIENT
Start: 2022-11-17 | End: 2023-01-23 | Stop reason: SDUPTHER

## 2022-11-17 NOTE — PROGRESS NOTES
Chief Complaint  Med Management (Tolerating medications well. ) and Follow-up (6 mo follow up. )    Subjective          Bhavna Astorga presents to [facility] CARDIOLOGY for follow-up.    History of Present Illness     Mr. Astorga was last seen in clinic on 5/16/2022.  At that visit patient was stable and at baseline.  No changes were made to his medications.    At today's visit he is accompanied by his wife.  He denies complaint of chest pain.  He does report a cough and some dyspnea on exertion.  This is stable for him and not worsening.  He denies any bright red blood per rectum or black tarry stools.     Unfortunately Mr. Astorga is unable to afford Eliquis and is reliant on samples.      Objective     Vital Signs:   /74 (BP Location: Left arm, Patient Position: Sitting, Cuff Size: Large Adult)   Pulse 99   Wt 96.8 kg (213 lb 6.4 oz)   SpO2 95%   BMI 28.94 kg/m²       Physical Exam  Constitutional:       Appearance: Normal appearance. He is well-developed.   Cardiovascular:      Rate and Rhythm: Normal rate. Rhythm irregular.      Heart sounds: No murmur heard.    No friction rub. No gallop.   Pulmonary:      Effort: Pulmonary effort is normal. No respiratory distress.      Breath sounds: Normal breath sounds. No wheezing or rales.   Skin:     General: Skin is warm and dry.   Neurological:      Mental Status: He is alert and oriented to person, place, and time.   Psychiatric:         Mood and Affect: Mood normal.         Behavior: Behavior normal.          Result Review :                Current Outpatient Medications   Medication Sig Dispense Refill   • albuterol sulfate  (90 Base) MCG/ACT inhaler Inhale 2 puffs Every 4 (Four) Hours As Needed for Wheezing or Shortness of Air. 18 g 8   • aspirin 81 MG EC tablet Take 1 tablet by mouth Daily. 30 tablet 0   • Budeson-Glycopyrrol-Formoterol (Breztri Aerosphere) 160-9-4.8 MCG/ACT aerosol inhaler Inhale 2 puffs 2 (Two) Times a Day.  1 each 8   • dapagliflozin (FARXIGA) 5 MG tablet tablet Take 5 mg by mouth Daily.     • furosemide (LASIX) 40 MG tablet Take 1 tablet by mouth Daily. 30 tablet 0   • gabapentin (NEURONTIN) 300 MG capsule Take 300 mg by mouth 2 (Two) Times a Day.     • ipratropium-albuterol (DUO-NEB) 0.5-2.5 mg/3 ml nebulizer Take 3 mL by nebulization 4 (Four) Times a Day As Needed for Wheezing or Shortness of Air. 120 mL 8   • losartan (COZAAR) 25 MG tablet Take 1 tablet by mouth Daily. 90 tablet 3   • metoprolol tartrate (LOPRESSOR) 50 MG tablet Take 1 tablet by mouth 2 (Two) Times a Day. 180 tablet 2   • pantoprazole (PROTONIX) 20 MG EC tablet Take 20 mg by mouth Daily.     • potassium chloride ER (K-TAB) 20 MEQ tablet controlled-release ER tablet Take 1 tablet by mouth Daily. 90 tablet 3   • predniSONE (DELTASONE) 20 MG tablet Take 2 tablets by mouth Daily. 10 tablet 0   • rosuvastatin (CRESTOR) 40 MG tablet Take 1 tablet by mouth Daily. 90 tablet 3   • sitaGLIPtin-metFORMIN (JANUMET)  MG per tablet Take 1 tablet by mouth 2 (Two) Times a Day With Meals.     • tamsulosin (FLOMAX) 0.4 MG capsule 24 hr capsule Take 1 capsule by mouth Daily.     • traZODone (DESYREL) 50 MG tablet Take 50 mg by mouth Every Night.     • vitamin D (ERGOCALCIFEROL) 1.25 MG (13876 UT) capsule capsule Take 50,000 Units by mouth Every 7 (Seven) Days. PTA: takes on mondays     • apixaban (ELIQUIS) 5 MG tablet tablet Take 1 tablet by mouth Every 12 (Twelve) Hours. Indications: Atrial Fibrillation 56 tablet 0     No current facility-administered medications for this visit.            Assessment and Plan    Problem List Items Addressed This Visit        Cardiac and Vasculature    Atrial fibrillation with RVR (HCC) - Primary (Chronic)    Overview     · 11/17/2021 new diagnosis atrial fibrillation.   · [id number] is at least  4  for hypertension, diabetes, CAD, and age  · Chronically anticoagulated on Eliquis 5 twice daily         Relevant Medications     metoprolol tartrate (LOPRESSOR) 50 MG tablet    Primary hypertension (Chronic)    Relevant Medications    furosemide (LASIX) 40 MG tablet    potassium chloride ER (K-TAB) 20 MEQ tablet controlled-release ER tablet    metoprolol tartrate (LOPRESSOR) 50 MG tablet    losartan (COZAAR) 25 MG tablet    ASCVD (arteriosclerotic cardiovascular disease) (Chronic)    Overview     · 2004, two stents placed, exact details unknown  · Before 2010, additional two stents placed, exact details unknown         Relevant Medications    furosemide (LASIX) 40 MG tablet    potassium chloride ER (K-TAB) 20 MEQ tablet controlled-release ER tablet    Dyslipidemia (Chronic)    Overview     · 3/11/2022 total cholesterol 234, triglycerides 235, HDL 37, and          Relevant Medications    rosuvastatin (CRESTOR) 40 MG tablet           Follow Up     Medications were reviewed with the patient.    ASCVD is stable.  Patient to continue aspirin, losartan, metoprolol, and rosuvastatin.    With regard to hypertension, this is stable.  Continue current medications.    Continue Eliquis for stroke prophylaxis in the setting of atrial fibrillation.    With regard to dyslipidemia, continue rosuvastatin.  Labs requested from PCP office.    Return in about 6 months (around 5/17/2023).    Patient was given instructions and counseling regarding his condition or for health maintenance advice. Please see specific information pulled into the AVS if appropriate.

## 2022-11-19 RX ORDER — ROSUVASTATIN CALCIUM 40 MG/1
40 TABLET, COATED ORAL DAILY
Qty: 90 TABLET | Refills: 3 | Status: SHIPPED | OUTPATIENT
Start: 2022-11-19

## 2022-11-19 RX ORDER — LOSARTAN POTASSIUM 25 MG/1
25 TABLET ORAL DAILY
Qty: 90 TABLET | Refills: 3 | Status: SHIPPED | OUTPATIENT
Start: 2022-11-19

## 2022-11-19 RX ORDER — POTASSIUM CHLORIDE 1500 MG/1
20 TABLET, FILM COATED, EXTENDED RELEASE ORAL DAILY
Qty: 90 TABLET | Refills: 3 | Status: SHIPPED | OUTPATIENT
Start: 2022-11-19 | End: 2023-01-23

## 2022-11-19 RX ORDER — METOPROLOL TARTRATE 50 MG/1
50 TABLET, FILM COATED ORAL 2 TIMES DAILY
Qty: 180 TABLET | Refills: 2 | Status: SHIPPED | OUTPATIENT
Start: 2022-11-19 | End: 2022-12-29

## 2022-12-29 DIAGNOSIS — I10 PRIMARY HYPERTENSION: Chronic | ICD-10-CM

## 2022-12-29 RX ORDER — METOPROLOL TARTRATE 50 MG/1
TABLET, FILM COATED ORAL
Qty: 180 TABLET | Refills: 2 | Status: SHIPPED | OUTPATIENT
Start: 2022-12-29

## 2023-01-23 ENCOUNTER — OFFICE VISIT (OUTPATIENT)
Dept: PULMONOLOGY | Facility: CLINIC | Age: 73
End: 2023-01-23
Payer: MEDICARE

## 2023-01-23 VITALS
TEMPERATURE: 97.6 F | WEIGHT: 215 LBS | OXYGEN SATURATION: 92 % | HEART RATE: 72 BPM | DIASTOLIC BLOOD PRESSURE: 66 MMHG | SYSTOLIC BLOOD PRESSURE: 114 MMHG | HEIGHT: 72 IN | BODY MASS INDEX: 29.12 KG/M2

## 2023-01-23 DIAGNOSIS — J44.9 CHRONIC OBSTRUCTIVE PULMONARY DISEASE, UNSPECIFIED COPD TYPE: ICD-10-CM

## 2023-01-23 DIAGNOSIS — J96.11 CHRONIC RESPIRATORY FAILURE WITH HYPOXIA: ICD-10-CM

## 2023-01-23 DIAGNOSIS — J60 CWP (COALWORKERS PNEUMOCONIOSIS): ICD-10-CM

## 2023-01-23 DIAGNOSIS — I25.10 ASCVD (ARTERIOSCLEROTIC CARDIOVASCULAR DISEASE): Chronic | ICD-10-CM

## 2023-01-23 DIAGNOSIS — Z87.891 FORMER SMOKER: Primary | ICD-10-CM

## 2023-01-23 PROCEDURE — 99204 OFFICE O/P NEW MOD 45 MIN: CPT | Performed by: PHYSICIAN ASSISTANT

## 2023-01-23 RX ORDER — IPRATROPIUM BROMIDE AND ALBUTEROL SULFATE 2.5; .5 MG/3ML; MG/3ML
3 SOLUTION RESPIRATORY (INHALATION) 4 TIMES DAILY PRN
Qty: 120 ML | Refills: 8 | Status: SHIPPED | OUTPATIENT
Start: 2023-01-23

## 2023-01-23 RX ORDER — CLOTRIMAZOLE AND BETAMETHASONE DIPROPIONATE 10; .64 MG/G; MG/G
CREAM TOPICAL
COMMUNITY
Start: 2022-12-09

## 2023-01-23 RX ORDER — POTASSIUM CHLORIDE 20 MEQ/1
TABLET, EXTENDED RELEASE ORAL
COMMUNITY
Start: 2022-11-21

## 2023-01-23 RX ORDER — DOXYCYCLINE HYCLATE 100 MG/1
100 CAPSULE ORAL 2 TIMES DAILY
Qty: 10 CAPSULE | Refills: 0 | Status: SHIPPED | OUTPATIENT
Start: 2023-01-23 | End: 2023-01-28

## 2023-01-23 RX ORDER — ALBUTEROL SULFATE 90 UG/1
2 AEROSOL, METERED RESPIRATORY (INHALATION) EVERY 4 HOURS PRN
Qty: 18 G | Refills: 8 | Status: SHIPPED | OUTPATIENT
Start: 2023-01-23

## 2023-01-23 RX ORDER — BUDESONIDE, GLYCOPYRROLATE, AND FORMOTEROL FUMARATE 160; 9; 4.8 UG/1; UG/1; UG/1
2 AEROSOL, METERED RESPIRATORY (INHALATION) 2 TIMES DAILY
Qty: 1 EACH | Refills: 8 | Status: SHIPPED | OUTPATIENT
Start: 2023-01-23

## 2023-01-23 NOTE — PROGRESS NOTES
Subjective      Chief Complaint  CWP (coalworkers pneumoconiosis) (HCC) and Cough    Subjective      History of Present Illness  Bhavna Astorga is a 72 y.o. male who presents today to [facility] for a follow-up appointment for CWP, COPD, chronic hypoxic respiratory failure, former tobacco abuse, and productive cough. His wife is present with him during his exam. Patient reports that his shortness of breath has worsened over time and states that he feels like he is smothering when he lies flat. He states that he has ran out of his prescription for Lasix and is needing it refilled. He states that he has not noticed any peripheral edema. He does report a productive cough with gray/yellow-tinged sputum. He also notes some increased nasal congestion mostly in the morning time when he first wakes up. He denies any fevers or chills. He states that his symptoms did respond well to the Doxycycline and Prednisone x 5 days that he was prescribed in November. He reports that his current inhaler regimen (Breztri and Albuterol) provide symptomatic relief. He does continue to use 2 L oxygen PRN but is not needing to use it an increased amount.     CWP (coalworkers pneumoconiosis) (HCC) and Cough:      Current Outpatient Medications:   •  albuterol sulfate  (90 Base) MCG/ACT inhaler, Inhale 2 puffs Every 4 (Four) Hours As Needed for Wheezing or Shortness of Air., Disp: 18 g, Rfl: 8  •  apixaban (ELIQUIS) 5 MG tablet tablet, Take 1 tablet by mouth Every 12 (Twelve) Hours. Indications: Atrial Fibrillation, Disp: 56 tablet, Rfl: 0  •  aspirin 81 MG EC tablet, Take 1 tablet by mouth Daily., Disp: 30 tablet, Rfl: 0  •  Budeson-Glycopyrrol-Formoterol (Breztri Aerosphere) 160-9-4.8 MCG/ACT aerosol inhaler, Inhale 2 puffs 2 (Two) Times a Day., Disp: 1 each, Rfl: 8  •  clotrimazole-betamethasone (LOTRISONE) 1-0.05 % cream, APPLY SMALL AMOUNT TOPICALLY TO THE AFFECTED AREA  "TWICE DAILY AS DIRECTED, Disp: , Rfl:   •  dapagliflozin (FARXIGA) 5 MG tablet tablet, Take 5 mg by mouth Daily., Disp: , Rfl:   •  furosemide (LASIX) 40 MG tablet, Take 1 tablet by mouth Daily., Disp: 30 tablet, Rfl: 0  •  gabapentin (NEURONTIN) 300 MG capsule, Take 300 mg by mouth 2 (Two) Times a Day., Disp: , Rfl:   •  ipratropium-albuterol (DUO-NEB) 0.5-2.5 mg/3 ml nebulizer, Take 3 mL by nebulization 4 (Four) Times a Day As Needed for Wheezing or Shortness of Air., Disp: 120 mL, Rfl: 8  •  losartan (COZAAR) 25 MG tablet, Take 1 tablet by mouth Daily., Disp: 90 tablet, Rfl: 3  •  metoprolol tartrate (LOPRESSOR) 50 MG tablet, TAKE 1 TABLET BY MOUTH TWICE DAILY, Disp: 180 tablet, Rfl: 2  •  pantoprazole (PROTONIX) 20 MG EC tablet, Take 20 mg by mouth Daily., Disp: , Rfl:   •  potassium chloride (K-DUR,KLOR-CON) 20 MEQ CR tablet, , Disp: , Rfl:   •  rosuvastatin (CRESTOR) 40 MG tablet, Take 1 tablet by mouth Daily., Disp: 90 tablet, Rfl: 3  •  sitaGLIPtin-metFORMIN (JANUMET)  MG per tablet, Take 1 tablet by mouth 2 (Two) Times a Day With Meals., Disp: , Rfl:   •  tamsulosin (FLOMAX) 0.4 MG capsule 24 hr capsule, Take 1 capsule by mouth Daily., Disp: , Rfl:   •  traZODone (DESYREL) 50 MG tablet, Take 50 mg by mouth Every Night., Disp: , Rfl:   •  vitamin D (ERGOCALCIFEROL) 1.25 MG (13425 UT) capsule capsule, Take 50,000 Units by mouth Every 7 (Seven) Days. PTA: takes on mondays, Disp: , Rfl:   •  doxycycline (VIBRAMYCIN) 100 MG capsule, Take 1 capsule by mouth 2 (Two) Times a Day for 5 days., Disp: 10 capsule, Rfl: 0      No Known Allergies    Objective     Objective   Vital Signs:  /66 (BP Location: Left arm, Patient Position: Sitting)   Pulse 72   Temp 97.6 °F (36.4 °C)   Ht 182.9 cm (72\")   Wt 97.5 kg (215 lb)   SpO2 92%   BMI 29.16 kg/m²   Estimated body mass index is 29.16 kg/m² as calculated from the following:    Height as of this encounter: 182.9 cm (72\").    Weight as of this encounter: " 97.5 kg (215 lb).    Past Medical History:   Diagnosis Date   • Arthritis    • Atrial fibrillation (HCC)    • BPH (benign prostatic hyperplasia)    • CAD (coronary artery disease)    • Coal workers pneumoconiosis (HCC)    • COPD (chronic obstructive pulmonary disease) (HCC)    • Diabetes mellitus (HCC)    • GERD (gastroesophageal reflux disease)    • Neuropathy    • Vitamin D deficiency      Past Surgical History:   Procedure Laterality Date   • CORONARY STENT PLACEMENT       Social History     Socioeconomic History   • Marital status:    Tobacco Use   • Smoking status: Former     Packs/day: 1.50     Types: Cigarettes     Start date: 1968     Quit date: 2004     Years since quittin.0   • Smokeless tobacco: Never   Vaping Use   • Vaping Use: Never used   Substance and Sexual Activity   • Alcohol use: No   • Drug use: No   • Sexual activity: Defer        Physical Exam  Constitutional:       Appearance: He is ill-appearing (chronically).   HENT:      Head: Normocephalic and atraumatic.      Nose: Nose normal.      Mouth/Throat:      Mouth: Mucous membranes are moist.      Pharynx: Oropharynx is clear.   Eyes:      Extraocular Movements: Extraocular movements intact.      Conjunctiva/sclera: Conjunctivae normal.      Pupils: Pupils are equal, round, and reactive to light.   Cardiovascular:      Rate and Rhythm: Normal rate and regular rhythm.      Pulses: Normal pulses.      Heart sounds: Normal heart sounds. No murmur heard.    No friction rub. No gallop.   Pulmonary:      Effort: Pulmonary effort is normal. No tachypnea, accessory muscle usage or respiratory distress.      Breath sounds: Normal breath sounds. No wheezing, rhonchi or rales.      Comments: Currently on room air  Abdominal:      General: There is no distension.   Musculoskeletal:         General: Normal range of motion.      Cervical back: Normal range of motion.      Right lower leg: No edema.      Left lower leg: No edema.   Skin:      General: Skin is warm and dry.   Neurological:      Mental Status: He is alert.   Psychiatric:         Mood and Affect: Mood normal.         Behavior: Behavior normal. Behavior is cooperative.        Result Review :  The following labs and radiology results have been reviewed.    Lab Review:   FEV1   Date Value Ref Range Status   07/16/2019 2.01 liters Final     FVC   Date Value Ref Range Status   07/16/2019 3.29 liters Final     No visits with results within 3 Month(s) from this visit.   Latest known visit with results is:   Hospital Outpatient Visit on 12/23/2021   Component Date Value Ref Range Status   • Target HR (85%) 12/23/2021 125  bpm Final   • Max. Pred. HR (100%) 12/23/2021 147  bpm Final   • Nuclear Prior Study 12/23/2021 3   Final   • BH CV REST NUCLEAR ISOTOPE DOSE 12/23/2021 9.6  mCi Final   • BH CV STRESS NUCLEAR ISOTOPE DOSE 12/23/2021 29.5  mCi Final   • BH CV STRESS PROTOCOL 1 12/23/2021 Pharmacologic   Final   • Stage 1 12/23/2021 1   Final   • HR Stage 1 12/23/2021 95   Final   • BP Stage 1 12/23/2021 172/101   Final   • Duration Min Stage 1 12/23/2021 0   Final   • Duration Sec Stage 1 12/23/2021 10   Final   • Stress Dose Regadenoson Stage 1 12/23/2021 0.4   Final   • Stress Comments Stage 1 12/23/2021 10 sec bolus injection   Final   • Stage 2 12/23/2021 2   Final   • HR Stage 2 12/23/2021 112   Final   • BP Stage 2 12/23/2021 167/99   Final   • Duration Min Stage 2 12/23/2021 4   Final   • Duration Sec Stage 2 12/23/2021 0   Final   • Stress Comments Stage 2 12/23/2021 recovery   Final   • Baseline HR 12/23/2021 82  bpm Final   • Baseline BP 12/23/2021 161/100  mmHg Final   • Peak HR 12/23/2021 112  bpm Final   • Percent Max Pred HR 12/23/2021 76.19  % Final   • Percent Target HR 12/23/2021 90  % Final   • Peak BP 12/23/2021 167/99  mmHg Final   • Recovery HR 12/23/2021 99  bpm Final   • Recovery BP 12/23/2021 186/103  mmHg Final   • Nuc Stress EF 12/23/2021 48  % Final      Reviewed  previous PFT from 07/2019      Reviewed previous chest XR from 11/2021    Assessment / Plan         Assessment   Diagnoses and all orders for this visit:    1. Former smoker (Primary)    2. CWP (coalworkers pneumoconiosis) ([facility])  -     albuterol sulfate  (90 Base) MCG/ACT inhaler; Inhale 2 puffs Every 4 (Four) Hours As Needed for Wheezing or Shortness of Air.  Dispense: 18 g; Refill: 8  -     ipratropium-albuterol (DUO-NEB) 0.5-2.5 mg/3 ml nebulizer; Take 3 mL by nebulization 4 (Four) Times a Day As Needed for Wheezing or Shortness of Air.  Dispense: 120 mL; Refill: 8  -     Budeson-Glycopyrrol-Formoterol (Breztri Aerosphere) 160-9-4.8 MCG/ACT aerosol inhaler; Inhale 2 puffs 2 (Two) Times a Day.  Dispense: 1 each; Refill: 8    3. Chronic obstructive pulmonary disease, unspecified COPD type ([facility])  -     albuterol sulfate  (90 Base) MCG/ACT inhaler; Inhale 2 puffs Every 4 (Four) Hours As Needed for Wheezing or Shortness of Air.  Dispense: 18 g; Refill: 8  -     ipratropium-albuterol (DUO-NEB) 0.5-2.5 mg/3 ml nebulizer; Take 3 mL by nebulization 4 (Four) Times a Day As Needed for Wheezing or Shortness of Air.  Dispense: 120 mL; Refill: 8  -     Budeson-Glycopyrrol-Formoterol (Breztri Aerosphere) 160-9-4.8 MCG/ACT aerosol inhaler; Inhale 2 puffs 2 (Two) Times a Day.  Dispense: 1 each; Refill: 8    4. Chronic respiratory failure with hypoxia ([facility])    Other orders  -     doxycycline (VIBRAMYCIN) 100 MG capsule; Take 1 capsule by mouth 2 (Two) Times a Day for 5 days.  Dispense: 10 capsule; Refill: 0      -COPD, Coal worker's pneumoconiosis with suspected mild acute exacerbation  · Continue albuterol inhaler as needed  · Continue DuoNeb treatments as needed.   · Continue Breztri as scheduled.   · Prescribed doxycycline x 5 days.   · No wheezing during exam, will hold off on steroids for now.   · Offered prescription of Zyrtec for allergy related symptoms, but stated he would  over the counter.    · Discussed with cardiology provider that patient had no refills left of Lasix; recent renal function and potassium level from labs obtained at outpatient facility on 12/08/22 reviewed (creatinine 0.78 -- baseline 0.6-0.7, potassium 4.1)      -Chronic hypoxic respiratory failure  · Remains compliant with as needed 2 L oxygen use during the daytime and nighttime.   · Saturation stable on room air during exam today.     -Former tobacco abuse  · Approximately 36-year smoking history with 1.5 ppd.   · No longer qualifies for lung cancer screening with LDCT scan due to >15 year since cessation (quit in 2004)    Follow Up   Return in about 6 months (around 7/23/2023), or if symptoms worsen or fail to improve, for Next scheduled follow up.    Patient was given instructions and counseling regarding his condition or for health maintenance advice. Please see specific information pulled into the AVS if appropriate.       This document has been electronically signed by Agnes Hernandez PA-C   January 23, 2023 14:01 EST    Dictated Utilizing Dragon Dictation: Part of this note may be an electronic transcription/translation of spoken language to printed text using the Dragon Dictation System.

## 2023-01-24 RX ORDER — FUROSEMIDE 40 MG/1
40 TABLET ORAL DAILY
Qty: 90 TABLET | Refills: 3 | Status: SHIPPED | OUTPATIENT
Start: 2023-01-24

## 2023-05-16 ENCOUNTER — TELEPHONE (OUTPATIENT)
Dept: PULMONOLOGY | Facility: CLINIC | Age: 73
End: 2023-05-16
Payer: MEDICARE

## 2023-05-16 RX ORDER — PREDNISONE 20 MG/1
40 TABLET ORAL DAILY
Qty: 10 TABLET | Refills: 0 | Status: SHIPPED | OUTPATIENT
Start: 2023-05-16 | End: 2023-05-21

## 2023-05-16 RX ORDER — AMOXICILLIN AND CLAVULANATE POTASSIUM 875; 125 MG/1; MG/1
1 TABLET, FILM COATED ORAL 2 TIMES DAILY
Qty: 14 TABLET | Refills: 0 | Status: SHIPPED | OUTPATIENT
Start: 2023-05-16 | End: 2023-05-23

## 2023-05-16 RX ORDER — GUAIFENESIN 600 MG/1
600 TABLET, EXTENDED RELEASE ORAL 2 TIMES DAILY PRN
Qty: 28 TABLET | Refills: 2 | Status: SHIPPED | OUTPATIENT
Start: 2023-05-16 | End: 2023-05-30

## 2023-05-16 NOTE — TELEPHONE ENCOUNTER
"Mrs. Astorga contacted the office. Stated that Mr. Astorga is having cough with intermittent dark mucous production, shortness of breath, and \"roaring\" cough.     Will send in   · Augmentin 7 days  · Prednisone 5 days  · Mucinex 14 days PRN.     Seek further evaluation as needed for persistent/worsening symptoms.   "

## 2023-06-06 NOTE — OUTREACH NOTE
Medical Week 1 Survey      Responses   [facility] patient discharged from? [facility]   Does the patient have one of the following disease processes/diagnoses(primary or secondary)? Other   Week 1 attempt successful? Yes   Call end time 1426   Discharge diagnosis Primary hypertension   Is patient permission given to speak with other caregiver? Yes   List who call center can speak with Gay - wife    Person spoke with today (if not patient) and relationship Gay  -wife    Medication alerts for this patient ASA, Breztri, Eliquis and Lopressor   Meds reviewed with patient/caregiver? Yes   Is the patient having any side effects they believe may be caused by any medication additions or changes? No   Does the patient have all medications ordered at discharge? Yes   Is the patient taking all medications as directed (includes completed medication regime)? Yes   Comments regarding appointments 11/24/2021- Goes to see PCP    Does the patient have a primary care provider?  Yes   Does the patient have an appointment with their PCP within 7 days of discharge? Yes   Has the patient kept scheduled appointments due by today? Yes   Has home health visited the patient within 72 hours of discharge? N/A   Psychosocial issues? No   Did the patient receive a copy of their discharge instructions? Yes   Nursing interventions Reviewed instructions with patient,  Educated on MyChart   What is the patient's perception of their health status since discharge? Same   Is the patient/caregiver able to teach back signs and symptoms related to disease process for when to call PCP? Yes   Is the patient/caregiver able to teach back signs and symptoms related to disease process for when to call 911? Yes   Is the patient/caregiver able to teach back the hierarchy of who to call/visit for symptoms/problems? PCP, Specialist, Home health nurse, Urgent Care, ED, 911 Yes   Additional teach back comments She states he is doing okay.   Week 1 call  completed? Yes          Janna Jaime RN   speak close to patient's  ear

## 2023-07-26 ENCOUNTER — OFFICE VISIT (OUTPATIENT)
Dept: PULMONOLOGY | Facility: CLINIC | Age: 73
End: 2023-07-26
Payer: OTHER MISCELLANEOUS

## 2023-07-26 VITALS
SYSTOLIC BLOOD PRESSURE: 162 MMHG | OXYGEN SATURATION: 95 % | HEART RATE: 89 BPM | WEIGHT: 215 LBS | DIASTOLIC BLOOD PRESSURE: 98 MMHG | HEIGHT: 72 IN | BODY MASS INDEX: 29.12 KG/M2 | TEMPERATURE: 96.9 F

## 2023-07-26 DIAGNOSIS — J44.1 CHRONIC OBSTRUCTIVE PULMONARY DISEASE WITH ACUTE EXACERBATION: Primary | ICD-10-CM

## 2023-07-26 DIAGNOSIS — Z87.891 FORMER SMOKER: ICD-10-CM

## 2023-07-26 DIAGNOSIS — J96.11 CHRONIC RESPIRATORY FAILURE WITH HYPOXIA: ICD-10-CM

## 2023-07-26 DIAGNOSIS — J60 CWP (COALWORKERS PNEUMOCONIOSIS): ICD-10-CM

## 2023-07-26 RX ORDER — ALBUTEROL SULFATE 90 UG/1
2 AEROSOL, METERED RESPIRATORY (INHALATION) EVERY 4 HOURS PRN
Qty: 18 G | Refills: 8 | Status: SHIPPED | OUTPATIENT
Start: 2023-07-26

## 2023-07-26 RX ORDER — BUDESONIDE, GLYCOPYRROLATE, AND FORMOTEROL FUMARATE 160; 9; 4.8 UG/1; UG/1; UG/1
2 AEROSOL, METERED RESPIRATORY (INHALATION) 2 TIMES DAILY
Qty: 1 EACH | Refills: 8 | Status: SHIPPED | OUTPATIENT
Start: 2023-07-26

## 2023-07-26 RX ORDER — IPRATROPIUM BROMIDE AND ALBUTEROL SULFATE 2.5; .5 MG/3ML; MG/3ML
3 SOLUTION RESPIRATORY (INHALATION) 4 TIMES DAILY PRN
Qty: 120 ML | Refills: 8 | Status: SHIPPED | OUTPATIENT
Start: 2023-07-26

## 2023-07-26 RX ORDER — AMOXICILLIN AND CLAVULANATE POTASSIUM 875; 125 MG/1; MG/1
TABLET, FILM COATED ORAL
COMMUNITY
Start: 2023-07-21

## 2023-07-26 RX ORDER — ATORVASTATIN CALCIUM 40 MG/1
TABLET, FILM COATED ORAL
COMMUNITY
Start: 2023-07-21

## 2023-07-26 RX ORDER — PREDNISONE 20 MG/1
40 TABLET ORAL DAILY
Qty: 10 TABLET | Refills: 0 | Status: SHIPPED | OUTPATIENT
Start: 2023-07-26 | End: 2023-07-31

## 2023-07-26 NOTE — PROGRESS NOTES
"Chief Complaint  CWP (coalworkers pneumoconiosis), Shortness of Breath (Worsening ), and Cough    Subjective        hBavna Astorga presents to [facility]  History of Present Illness        Mr. Huggins presents today for follow up of COPD, CWP, chronic hypoxic respiratory failure, and former smoking history. He has noted overall progression recently versus acute symptoms. States that he becomes very short of breath with minimal exertion. He is using his portable oxygen supplies today on 3 L continuous via large tank. Continues with maintenance medications as directed.   History also complicated by atrial fibrillation.   Acute sinus congestion resolved after previous treatment.   Has used oxygen more frequently due to worsening of symptoms.       Objective   Vital Signs:  /98 (BP Location: Left arm, Patient Position: Sitting)   Pulse 89   Temp 96.9 °F (36.1 °C)   Ht 182.9 cm (72\")   Wt 97.5 kg (215 lb)   SpO2 95% Comment: 3L TANK  BMI 29.16 kg/m²   Estimated body mass index is 29.16 kg/m² as calculated from the following:    Height as of this encounter: 182.9 cm (72\").    Weight as of this encounter: 97.5 kg (215 lb).         Physical Exam  Vitals reviewed.   Constitutional:       General: He is not in acute distress.     Appearance: He is well-developed. He is not diaphoretic.   HENT:      Head: Normocephalic and atraumatic.   Cardiovascular:      Rate and Rhythm: Normal rate. Rhythm irregular.      Heart sounds: Normal heart sounds, S1 normal and S2 normal.   Pulmonary:      Effort: Pulmonary effort is normal.      Breath sounds: Wheezing present. No rhonchi or rales.   Neurological:      Mental Status: He is alert and oriented to person, place, and time.   Psychiatric:         Behavior: Behavior normal.        Result Review :  The following data was reviewed by: Denise Walls PA-C on 07/26/2023:      Reviewed hospitalization notes from February " 2023.   Reviewed chest xray report from February 2023.   Reviewed echo report (2023).   Reviewed CT Chest report (2023).        Assessment and Plan   Diagnoses and all orders for this visit:    1. Chronic obstructive pulmonary disease with acute exacerbation (Primary)  -     ipratropium-albuterol (DUO-NEB) 0.5-2.5 mg/3 ml nebulizer; Take 3 mL by nebulization 4 (Four) Times a Day As Needed for Wheezing or Shortness of Air.  Dispense: 120 mL; Refill: 8  -     Budeson-Glycopyrrol-Formoterol (Breztri Aerosphere) 160-9-4.8 MCG/ACT aerosol inhaler; Inhale 2 puffs 2 (Two) Times a Day.  Dispense: 1 each; Refill: 8  -     albuterol sulfate  (90 Base) MCG/ACT inhaler; Inhale 2 puffs Every 4 (Four) Hours As Needed for Wheezing or Shortness of Air.  Dispense: 18 g; Refill: 8    2. Chronic respiratory failure with hypoxia    3. CWP (coalworkers pneumoconiosis)  -     ipratropium-albuterol (DUO-NEB) 0.5-2.5 mg/3 ml nebulizer; Take 3 mL by nebulization 4 (Four) Times a Day As Needed for Wheezing or Shortness of Air.  Dispense: 120 mL; Refill: 8  -     Budeson-Glycopyrrol-Formoterol (Breztri Aerosphere) 160-9-4.8 MCG/ACT aerosol inhaler; Inhale 2 puffs 2 (Two) Times a Day.  Dispense: 1 each; Refill: 8  -     albuterol sulfate  (90 Base) MCG/ACT inhaler; Inhale 2 puffs Every 4 (Four) Hours As Needed for Wheezing or Shortness of Air.  Dispense: 18 g; Refill: 8    4. Former smoker    Other orders  -     predniSONE (DELTASONE) 20 MG tablet; Take 2 tablets by mouth Daily for 5 days.  Dispense: 10 tablet; Refill: 0          COPD (emphysema type) with acute exacerbation, coal workers pneumoconiosis:  Continue albuterol inhaler as needed  Continue duoenbs as needed  Continue Breztri as scheduled  Encouraged continued follow up with cardiology for management as A fib can contribute to shortness of breath.   Was notable for new stent placement back in February 2023 hospitalization.  A fib could result in pulmonary vascular  congestion   Ordered prednisone course for acute wheezing.   Will hold off on antibiotics at this time - less concern for infection at this time.   Recent CT chest reports unchanged fibrosis, calcified and noncalcified nodules.   Prefers to await repeat imaging at this time.   Takes scheduled diuretic as well       Chronic hypoxic respiratory failure:  Continues with as needed oxygen use during the daytime and nighttime use of 2-3 L. Using 3 L today with ambulation.   Typically able to titrate down but had to increase recently due to acute symptoms.        Former smoker:  No longer qualifies for lung cancer screening CTs due to year since cessation. Notable for an approximately 36-year history of 1.5 ppd average.  Newest report notable for calcified and noncalcified nodules (stable since 2014), with larger fibrotic regions that are unchanged.   Does not qualify for screening CTs. Prefers to await imaging unless symptoms do not improve, then will consider.       Follow Up   Return in about 3 months (around 10/26/2023), or if symptoms worsen or fail to improve, for Recheck.  Patient was given instructions and counseling regarding his condition or for health maintenance advice. Please see specific information pulled into the AVS if appropriate.

## 2023-09-14 ENCOUNTER — TELEPHONE (OUTPATIENT)
Dept: PULMONOLOGY | Facility: CLINIC | Age: 73
End: 2023-09-14
Payer: MEDICARE

## 2023-09-14 RX ORDER — AMOXICILLIN AND CLAVULANATE POTASSIUM 875; 125 MG/1; MG/1
1 TABLET, FILM COATED ORAL 2 TIMES DAILY
Qty: 14 TABLET | Refills: 0 | Status: SHIPPED | OUTPATIENT
Start: 2023-09-14 | End: 2023-09-21

## 2023-09-14 RX ORDER — PREDNISONE 10 MG/1
TABLET ORAL
Qty: 31 TABLET | Refills: 0 | Status: SHIPPED | OUTPATIENT
Start: 2023-09-14

## 2023-09-14 NOTE — TELEPHONE ENCOUNTER
Patient notable for COPD exacerbation with wheezing, drainage.   Had recent hospitalization (can't see notes yet).     Talked to the wife by phone.   Sent 7 day course Augmentin and Prednisone 12 day taper pack.   Still using other COPD meds (Breztri, rescue nebs as needed).   Seek further evaluation as needed.

## 2023-11-08 ENCOUNTER — TELEPHONE (OUTPATIENT)
Dept: PULMONOLOGY | Facility: CLINIC | Age: 73
End: 2023-11-08
Payer: MEDICARE

## 2023-11-08 DIAGNOSIS — J44.1 CHRONIC OBSTRUCTIVE PULMONARY DISEASE WITH ACUTE EXACERBATION: ICD-10-CM

## 2023-11-08 DIAGNOSIS — J60 CWP (COALWORKERS PNEUMOCONIOSIS): ICD-10-CM

## 2023-11-08 RX ORDER — ALBUTEROL SULFATE 90 UG/1
2 AEROSOL, METERED RESPIRATORY (INHALATION) EVERY 4 HOURS PRN
Qty: 18 G | Refills: 8 | Status: SHIPPED | OUTPATIENT
Start: 2023-11-08

## 2023-11-08 RX ORDER — BUDESONIDE, GLYCOPYRROLATE, AND FORMOTEROL FUMARATE 160; 9; 4.8 UG/1; UG/1; UG/1
2 AEROSOL, METERED RESPIRATORY (INHALATION) 2 TIMES DAILY
Qty: 1 EACH | Refills: 8 | Status: SHIPPED | OUTPATIENT
Start: 2023-11-08

## 2023-11-08 RX ORDER — IPRATROPIUM BROMIDE AND ALBUTEROL SULFATE 2.5; .5 MG/3ML; MG/3ML
3 SOLUTION RESPIRATORY (INHALATION) 4 TIMES DAILY PRN
Qty: 120 ML | Refills: 8 | Status: SHIPPED | OUTPATIENT
Start: 2023-11-08

## 2023-11-08 NOTE — TELEPHONE ENCOUNTER
Caller: Gay Astorga    Relationship: Emergency Contact    Best call back number: [phone]    Requested Prescriptions:   ALL MEDICATIONS     Pharmacy where request should be sent: [facility] [place] - [phone]     Last office visit with prescribing clinician: 7/26/2023   Last telemedicine visit with prescribing clinician: Visit date not found   Next office visit with prescribing clinician: Visit date not found     Additional details provided by patient: PT IS GETTING LOW ON MEDICATIONS HAD TO CANCEL APPT DUE TO BROKEN BACK AND PELVIC BONE AND IS UNABLE TO WALK DUE TO A FALL AND WILL NOT BE ABLE TO COME IN FOR SOME TIME. PLEASE CALL IN MEDICATIONS AS NORMAL AND CALL PT TO ADVISE.     Does the patient have less than a 3 day supply:  [] Yes  [x] No    Would you like a call back once the refill request has been completed: [x] Yes [] No    If the office needs to give you a call back, can they leave a voicemail: [] Yes [x] No    Christophe Mcleod Rep   11/08/23 11:01 EST

## 2023-11-08 NOTE — TELEPHONE ENCOUNTER
Patient called to request refill's, as he is unable to come to his appointment due to an injury. Sent to pharmacy.

## 2023-11-16 RX ORDER — PREDNISONE 20 MG/1
40 TABLET ORAL DAILY
Qty: 10 TABLET | Refills: 0 | Status: SHIPPED | OUTPATIENT
Start: 2023-11-16 | End: 2023-11-21

## 2023-11-16 RX ORDER — AMOXICILLIN AND CLAVULANATE POTASSIUM 875; 125 MG/1; MG/1
1 TABLET, FILM COATED ORAL 2 TIMES DAILY
Qty: 14 TABLET | Refills: 0 | Status: SHIPPED | OUTPATIENT
Start: 2023-11-16 | End: 2023-11-23

## 2023-11-16 NOTE — PROGRESS NOTES
Patient had recent fall (broken pelvis, back injury).   Worried about current lung exacerbation/infection.     Ordered prednisone course and antibiotic (Augmentin).   Advised to call as needed if he becomes ill.   Will aim for regular follow up in January if he is able or will reschedule as needed.